# Patient Record
Sex: MALE | Race: OTHER | HISPANIC OR LATINO | URBAN - METROPOLITAN AREA
[De-identification: names, ages, dates, MRNs, and addresses within clinical notes are randomized per-mention and may not be internally consistent; named-entity substitution may affect disease eponyms.]

---

## 2017-03-11 ENCOUNTER — EMERGENCY (EMERGENCY)
Facility: HOSPITAL | Age: 37
LOS: 1 days | Discharge: HOME | End: 2017-03-11
Attending: EMERGENCY MEDICINE | Admitting: EMERGENCY MEDICINE
Payer: SELF-PAY

## 2017-03-11 VITALS
TEMPERATURE: 99 F | RESPIRATION RATE: 18 BRPM | DIASTOLIC BLOOD PRESSURE: 76 MMHG | HEART RATE: 61 BPM | HEIGHT: 68 IN | WEIGHT: 195.99 LBS | OXYGEN SATURATION: 96 % | SYSTOLIC BLOOD PRESSURE: 118 MMHG

## 2017-03-11 PROCEDURE — 99283 EMERGENCY DEPT VISIT LOW MDM: CPT

## 2017-03-11 RX ORDER — MOMETASONE FUROATE 1 MG/G
1 CREAM TOPICAL
Qty: 1 | Refills: 0 | OUTPATIENT
Start: 2017-03-11 | End: 2017-03-18

## 2017-03-11 NOTE — ED STATDOCS - OBJECTIVE STATEMENT
35 y/o M presents to the ED complaining of difficulty breathing, abdominal distension, and depression x 5 days. Pt reports hx of depression but denies taking medications or prior hospital admissions for depression. He denies PO intolerance, nausea, vomiting, and SI/HI. Pt has hx of pancreatitis and TBI (2010) but denies hx of HTN, DM, and HLD. PSHx includes craniotomy and cholecystectomy. No relief with Pepcid. No current medications. Pt denies seeing PCP for this complaint. No further complaints at this time. 37 y/o M presents to the ED complaining of difficulty breathing, abdominal distension, and depression x 5 days. Pt reports hx of depression but denies taking medications or prior hospital admissions for depression. He denies PO intolerance, nausea, vomiting, and SI/HI. Pt has hx of pancreatitis and TBI (2010) but denies hx of HTN, DM, and HLD. PSHx includes craniotomy and cholecystectomy. No relief with Pepcid. No current medications. Pt denies seeing PCP for this complaint. No further complaints at this time. Very difficult to get history from patient as patient talks about wanting the MD to take the wax out of his ear, then says that he wants a medication to keep him from gaing weight.  Girlfriend is with patient and explained that pt was in a coma for 8 months and sustained a severe brain injury

## 2017-03-11 NOTE — ED STATDOCS - ENMT, MLM
Nasal mucosa clear.  Mouth with normal mucosa  Throat has no vesicles, no oropharyngeal exudates and uvula is midline. TMs within normal limit.

## 2017-03-11 NOTE — ED STATDOCS - NS ED MD SCRIBE ATTENDING SCRIBE SECTIONS
PHYSICAL EXAM/PAST MEDICAL/SURGICAL/SOCIAL HISTORY/VITAL SIGNS( Pullset)/REVIEW OF SYSTEMS/DISPOSITION/HIV/HISTORY OF PRESENT ILLNESS/INTAKE ASSESSMENT/SCREENINGS

## 2017-03-11 NOTE — ED STATDOCS - NS ED ROS FT
Pt says that his stomach hurts, he has wax in his ears, he is depressed and was supposed to see the doctor by appointment but had something else to do so he didn't go

## 2017-03-11 NOTE — ED STATDOCS - MEDICAL DECISION MAKING DETAILS
Pt is a TBI pt with multiple complaints. Negative PE except for eczema. Will treat with benadryl and imidazole cream, and have pt f/u with PMD. Pt is a TBI pt with multiple complaints. Negative PE except for eczema. Will treat with benadryl and  mometasone cream, and have pt f/u with PMD.

## 2017-03-26 ENCOUNTER — EMERGENCY (EMERGENCY)
Facility: HOSPITAL | Age: 37
LOS: 1 days | Discharge: HOME | End: 2017-03-26
Attending: EMERGENCY MEDICINE
Payer: SELF-PAY

## 2017-03-26 VITALS
DIASTOLIC BLOOD PRESSURE: 83 MMHG | OXYGEN SATURATION: 98 % | HEIGHT: 69 IN | TEMPERATURE: 98 F | WEIGHT: 199.96 LBS | RESPIRATION RATE: 17 BRPM | HEART RATE: 60 BPM | SYSTOLIC BLOOD PRESSURE: 124 MMHG

## 2017-03-26 DIAGNOSIS — M25.531 PAIN IN RIGHT WRIST: ICD-10-CM

## 2017-03-26 PROCEDURE — 99283 EMERGENCY DEPT VISIT LOW MDM: CPT

## 2017-03-26 PROCEDURE — 73110 X-RAY EXAM OF WRIST: CPT

## 2017-03-26 PROCEDURE — 99284 EMERGENCY DEPT VISIT MOD MDM: CPT | Mod: 25

## 2017-03-26 RX ORDER — INDOMETHACIN 50 MG
50 CAPSULE ORAL ONCE
Qty: 0 | Refills: 0 | Status: COMPLETED | OUTPATIENT
Start: 2017-03-26 | End: 2017-03-26

## 2017-03-26 RX ORDER — OXYCODONE HYDROCHLORIDE 5 MG/1
5 TABLET ORAL ONCE
Qty: 0 | Refills: 0 | Status: DISCONTINUED | OUTPATIENT
Start: 2017-03-26 | End: 2017-03-26

## 2017-03-26 RX ORDER — INDOMETHACIN 50 MG
1 CAPSULE ORAL
Qty: 21 | Refills: 0 | OUTPATIENT
Start: 2017-03-26 | End: 2017-04-02

## 2017-03-26 RX ORDER — OXYCODONE HYDROCHLORIDE 5 MG/1
1 TABLET ORAL
Qty: 8 | Refills: 0 | OUTPATIENT
Start: 2017-03-26 | End: 2017-03-28

## 2017-03-26 RX ORDER — SODIUM CHLORIDE 9 MG/ML
1000 INJECTION INTRAMUSCULAR; INTRAVENOUS; SUBCUTANEOUS ONCE
Qty: 0 | Refills: 0 | Status: DISCONTINUED | OUTPATIENT
Start: 2017-03-26 | End: 2017-03-26

## 2017-03-26 RX ADMIN — Medication 60 MILLIGRAM(S): at 23:59

## 2017-03-26 RX ADMIN — Medication 50 MILLIGRAM(S): at 23:59

## 2017-03-26 RX ADMIN — OXYCODONE HYDROCHLORIDE 5 MILLIGRAM(S): 5 TABLET ORAL at 23:59

## 2017-03-27 PROCEDURE — 73110 X-RAY EXAM OF WRIST: CPT | Mod: 26,RT

## 2017-03-27 NOTE — ED PROVIDER NOTE - OBJECTIVE STATEMENT
This is a 35 y/o male presenting to the ED with right wrist pain that started suddenly this morning. Pt ate shrimp last night. He admits to pain, swelling, heat. He denies falls, injury, fever, chills, loss of rom, loss of sensation, previous hx of gout, sob, cp, hp.

## 2017-03-27 NOTE — ED PROVIDER NOTE - ATTENDING CONTRIBUTION TO CARE
pt with wrist pain.  appears to be gouty arthropathy.  no pain with distal active/passive ROM - doubt septic joint.  d/c with analgesics and pmd follow up

## 2017-03-27 NOTE — ED PROVIDER NOTE - LOCATION
wrist wrist/ttp diffuse right wrist, +swelling ,+heat, no snuff box tenderness, from of right wrist with pain, strength and sensation intact in right wrist, cap refill < 2 secs, radial pulse intact, no skin changes consistent with cellulitis

## 2017-04-16 ENCOUNTER — EMERGENCY (EMERGENCY)
Facility: HOSPITAL | Age: 37
LOS: 1 days | Discharge: HOME | End: 2017-04-16
Attending: EMERGENCY MEDICINE
Payer: SELF-PAY

## 2017-04-16 VITALS
DIASTOLIC BLOOD PRESSURE: 69 MMHG | SYSTOLIC BLOOD PRESSURE: 102 MMHG | HEART RATE: 86 BPM | OXYGEN SATURATION: 97 % | RESPIRATION RATE: 16 BRPM | TEMPERATURE: 99 F

## 2017-04-16 LAB
APPEARANCE UR: CLEAR — SIGNIFICANT CHANGE UP
BACTERIA # UR AUTO: NEGATIVE — SIGNIFICANT CHANGE UP
BILIRUB UR-MCNC: NEGATIVE — SIGNIFICANT CHANGE UP
COLOR SPEC: YELLOW — SIGNIFICANT CHANGE UP
DIFF PNL FLD: NEGATIVE — SIGNIFICANT CHANGE UP
EPI CELLS # UR: NEGATIVE — SIGNIFICANT CHANGE UP
GLUCOSE UR QL: NEGATIVE MG/DL — SIGNIFICANT CHANGE UP
KETONES UR-MCNC: NEGATIVE — SIGNIFICANT CHANGE UP
LEUKOCYTE ESTERASE UR-ACNC: ABNORMAL
NITRITE UR-MCNC: NEGATIVE — SIGNIFICANT CHANGE UP
PH UR: 7 — SIGNIFICANT CHANGE UP (ref 4.8–8)
PROT UR-MCNC: NEGATIVE MG/DL — SIGNIFICANT CHANGE UP
RBC CASTS # UR COMP ASSIST: NEGATIVE /HPF — SIGNIFICANT CHANGE UP (ref 0–4)
SP GR SPEC: 1 — LOW (ref 1.01–1.02)
UROBILINOGEN FLD QL: NEGATIVE MG/DL — SIGNIFICANT CHANGE UP
WBC UR QL: SIGNIFICANT CHANGE UP

## 2017-04-16 PROCEDURE — 99283 EMERGENCY DEPT VISIT LOW MDM: CPT

## 2017-04-16 PROCEDURE — 81001 URINALYSIS AUTO W/SCOPE: CPT

## 2017-04-16 RX ORDER — METHOCARBAMOL 500 MG/1
2 TABLET, FILM COATED ORAL
Qty: 18 | Refills: 0 | OUTPATIENT
Start: 2017-04-16 | End: 2017-04-19

## 2017-04-16 NOTE — ED STATDOCS - MUSCULOSKELETAL, MLM
Slight tenderness in the right para lumbar muscles. Normal LE strength. Ambulating without difficulty. FROM all extremities x4. Normal straight leg raise bilat. Able to bend at waist at 30 degrees with pain.

## 2017-04-16 NOTE — ED STATDOCS - PROGRESS NOTE DETAILS
Pt reports feeling better and states that he will like to go home. Pt reports feeling better and states that he will like to go home. States that he does not want to wait for urine results. D/w Dr. Walden-- pt stable for d/c.

## 2017-04-16 NOTE — ED STATDOCS - NS ED MD SCRIBE ATTENDING SCRIBE SECTIONS
PHYSICAL EXAM/VITAL SIGNS( Pullset)/INTAKE ASSESSMENT/SCREENINGS/HISTORY OF PRESENT ILLNESS/REVIEW OF SYSTEMS/DISPOSITION/HIV/PAST MEDICAL/SURGICAL/SOCIAL HISTORY

## 2017-04-16 NOTE — ED STATDOCS - MEDICAL DECISION MAKING DETAILS
Pt states he was having some discomfort with urination so will do UA. If -, will d/c with Robaxin. Pt has hx of gastritis do not use NSAIDS.

## 2017-04-16 NOTE — ED STATDOCS - OBJECTIVE STATEMENT
35 y/o male with hx of TBI presents to ED c/o right sided low back pain onset yesterday. Pain is exacerbated when he bends down; he reports he may have hurt it while lifting something heavy. Pt has not taken any medication for the symptoms. Denies fever, chills, numbness. No further complaints at this time.

## 2017-04-20 DIAGNOSIS — R56.9 UNSPECIFIED CONVULSIONS: ICD-10-CM

## 2017-04-20 DIAGNOSIS — M54.5 LOW BACK PAIN: ICD-10-CM

## 2017-04-20 DIAGNOSIS — R39.198 OTHER DIFFICULTIES WITH MICTURITION: ICD-10-CM

## 2017-04-20 DIAGNOSIS — Z98.890 OTHER SPECIFIED POSTPROCEDURAL STATES: ICD-10-CM

## 2017-06-04 ENCOUNTER — EMERGENCY (EMERGENCY)
Facility: HOSPITAL | Age: 37
LOS: 1 days | Discharge: HOME | End: 2017-06-04
Attending: EMERGENCY MEDICINE
Payer: SELF-PAY

## 2017-06-04 VITALS
TEMPERATURE: 98 F | RESPIRATION RATE: 16 BRPM | DIASTOLIC BLOOD PRESSURE: 64 MMHG | WEIGHT: 184.97 LBS | HEIGHT: 66 IN | HEART RATE: 54 BPM | SYSTOLIC BLOOD PRESSURE: 104 MMHG | OXYGEN SATURATION: 99 %

## 2017-06-04 LAB
ALBUMIN SERPL ELPH-MCNC: 4.1 G/DL — SIGNIFICANT CHANGE UP (ref 3.3–5.2)
ALP SERPL-CCNC: 41 U/L — SIGNIFICANT CHANGE UP (ref 40–120)
ALT FLD-CCNC: 31 U/L — SIGNIFICANT CHANGE UP
ANION GAP SERPL CALC-SCNC: 15 MMOL/L — SIGNIFICANT CHANGE UP (ref 5–17)
AST SERPL-CCNC: 41 U/L — HIGH
BASOPHILS # BLD AUTO: 0 K/UL — SIGNIFICANT CHANGE UP (ref 0–0.2)
BASOPHILS NFR BLD AUTO: 0.2 % — SIGNIFICANT CHANGE UP (ref 0–2)
BILIRUB SERPL-MCNC: 0.6 MG/DL — SIGNIFICANT CHANGE UP (ref 0.4–2)
BUN SERPL-MCNC: 10 MG/DL — SIGNIFICANT CHANGE UP (ref 8–20)
CALCIUM SERPL-MCNC: 8.2 MG/DL — LOW (ref 8.6–10.2)
CHLORIDE SERPL-SCNC: 106 MMOL/L — SIGNIFICANT CHANGE UP (ref 98–107)
CO2 SERPL-SCNC: 24 MMOL/L — SIGNIFICANT CHANGE UP (ref 22–29)
CREAT SERPL-MCNC: 0.67 MG/DL — SIGNIFICANT CHANGE UP (ref 0.5–1.3)
EOSINOPHIL # BLD AUTO: 0.3 K/UL — SIGNIFICANT CHANGE UP (ref 0–0.5)
EOSINOPHIL NFR BLD AUTO: 4.9 % — SIGNIFICANT CHANGE UP (ref 0–5)
ETHANOL SERPL-MCNC: 131 MG/DL — SIGNIFICANT CHANGE UP
ETHANOL SERPL-MCNC: 179 MG/DL — SIGNIFICANT CHANGE UP
GLUCOSE SERPL-MCNC: 105 MG/DL — SIGNIFICANT CHANGE UP (ref 70–115)
HCT VFR BLD CALC: 40.2 % — LOW (ref 42–52)
HGB BLD-MCNC: 13.3 G/DL — LOW (ref 14–18)
LYMPHOCYTES # BLD AUTO: 1.6 K/UL — SIGNIFICANT CHANGE UP (ref 1–4.8)
LYMPHOCYTES # BLD AUTO: 28.1 % — SIGNIFICANT CHANGE UP (ref 20–55)
MCHC RBC-ENTMCNC: 28.1 PG — SIGNIFICANT CHANGE UP (ref 27–31)
MCHC RBC-ENTMCNC: 33.1 G/DL — SIGNIFICANT CHANGE UP (ref 32–36)
MCV RBC AUTO: 84.8 FL — SIGNIFICANT CHANGE UP (ref 80–94)
MONOCYTES # BLD AUTO: 0.3 K/UL — SIGNIFICANT CHANGE UP (ref 0–0.8)
MONOCYTES NFR BLD AUTO: 5.6 % — SIGNIFICANT CHANGE UP (ref 3–10)
NEUTROPHILS # BLD AUTO: 3.5 K/UL — SIGNIFICANT CHANGE UP (ref 1.8–8)
NEUTROPHILS NFR BLD AUTO: 61 % — SIGNIFICANT CHANGE UP (ref 37–73)
PLATELET # BLD AUTO: 187 K/UL — SIGNIFICANT CHANGE UP (ref 150–400)
POTASSIUM SERPL-MCNC: 3.9 MMOL/L — SIGNIFICANT CHANGE UP (ref 3.5–5.3)
POTASSIUM SERPL-SCNC: 3.9 MMOL/L — SIGNIFICANT CHANGE UP (ref 3.5–5.3)
PROT SERPL-MCNC: 7.4 G/DL — SIGNIFICANT CHANGE UP (ref 6.6–8.7)
RBC # BLD: 4.74 M/UL — SIGNIFICANT CHANGE UP (ref 4.6–6.2)
RBC # FLD: 13 % — SIGNIFICANT CHANGE UP (ref 11–15.6)
SODIUM SERPL-SCNC: 145 MMOL/L — SIGNIFICANT CHANGE UP (ref 135–145)
WBC # BLD: 5.7 K/UL — SIGNIFICANT CHANGE UP (ref 4.8–10.8)
WBC # FLD AUTO: 5.7 K/UL — SIGNIFICANT CHANGE UP (ref 4.8–10.8)

## 2017-06-04 PROCEDURE — 71010: CPT | Mod: 26

## 2017-06-04 PROCEDURE — 99284 EMERGENCY DEPT VISIT MOD MDM: CPT

## 2017-06-04 PROCEDURE — 70450 CT HEAD/BRAIN W/O DYE: CPT | Mod: 26

## 2017-06-04 RX ORDER — SODIUM CHLORIDE 9 MG/ML
1000 INJECTION INTRAMUSCULAR; INTRAVENOUS; SUBCUTANEOUS ONCE
Qty: 0 | Refills: 0 | Status: COMPLETED | OUTPATIENT
Start: 2017-06-04 | End: 2017-06-04

## 2017-06-04 RX ADMIN — SODIUM CHLORIDE 2000 MILLILITER(S): 9 INJECTION INTRAMUSCULAR; INTRAVENOUS; SUBCUTANEOUS at 19:13

## 2017-06-04 NOTE — ED PROVIDER NOTE - PROGRESS NOTE DETAILS
Pt. now more awake and alert. Pt. asking for food and water. Pt. will still go for head CT. Pt. is improving. Case endorsed to Dr. Russell. Follow up BAL.

## 2017-06-04 NOTE — ED PROVIDER NOTE - PHYSICAL EXAMINATION
Pt. is drowsy.   Surgical scar noted to Left parietal region of scalp.  Fungal appearing rash to right lateral chest/abdominal region
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
## 2017-06-04 NOTE — ED ADULT TRIAGE NOTE - CHIEF COMPLAINT QUOTE
BIBA, patient is arouasble to verbal and tactile stimuli, patient admitted to drinking alcohol to ems, unable to provide any hx, Dr Glover at bedside upon arrival

## 2017-06-04 NOTE — ED ADULT NURSE REASSESSMENT NOTE - NS ED NURSE REASSESS COMMENT FT1
Care assumed from off going Rn Jermain at this time, charting as noted. Pt is currently sleeping in stretcher on CM and  in NSR. Pt appears to be in NAD at this time, Will continue to monitor.

## 2017-06-04 NOTE — ED ADULT NURSE NOTE - OBJECTIVE STATEMENT
received in room awake slurred speech [id number], admits to drinking alcohol. denies injury. no belongings at bedside pt wearing hospital gown

## 2017-06-05 VITALS
OXYGEN SATURATION: 98 % | DIASTOLIC BLOOD PRESSURE: 58 MMHG | SYSTOLIC BLOOD PRESSURE: 113 MMHG | TEMPERATURE: 98 F | RESPIRATION RATE: 16 BRPM | HEART RATE: 76 BPM

## 2017-06-21 ENCOUNTER — EMERGENCY (EMERGENCY)
Facility: HOSPITAL | Age: 37
LOS: 1 days | Discharge: HOME | End: 2017-06-21
Attending: EMERGENCY MEDICINE | Admitting: EMERGENCY MEDICINE
Payer: SELF-PAY

## 2017-06-21 VITALS
TEMPERATURE: 98 F | OXYGEN SATURATION: 96 % | DIASTOLIC BLOOD PRESSURE: 64 MMHG | SYSTOLIC BLOOD PRESSURE: 96 MMHG | HEART RATE: 63 BPM | RESPIRATION RATE: 18 BRPM

## 2017-06-21 VITALS — HEIGHT: 69 IN | WEIGHT: 210.1 LBS

## 2017-06-21 LAB
ALBUMIN SERPL ELPH-MCNC: 4.1 G/DL — SIGNIFICANT CHANGE UP (ref 3.3–5.2)
ALP SERPL-CCNC: 44 U/L — SIGNIFICANT CHANGE UP (ref 40–120)
ALT FLD-CCNC: 22 U/L — SIGNIFICANT CHANGE UP
ANION GAP SERPL CALC-SCNC: 15 MMOL/L — SIGNIFICANT CHANGE UP (ref 5–17)
APPEARANCE UR: CLEAR — SIGNIFICANT CHANGE UP
AST SERPL-CCNC: 28 U/L — SIGNIFICANT CHANGE UP
BACTERIA # UR AUTO: NEGATIVE — SIGNIFICANT CHANGE UP
BASOPHILS # BLD AUTO: 0 K/UL — SIGNIFICANT CHANGE UP (ref 0–0.2)
BASOPHILS NFR BLD AUTO: 0.4 % — SIGNIFICANT CHANGE UP (ref 0–2)
BILIRUB SERPL-MCNC: 0.8 MG/DL — SIGNIFICANT CHANGE UP (ref 0.4–2)
BILIRUB UR-MCNC: NEGATIVE — SIGNIFICANT CHANGE UP
BUN SERPL-MCNC: 13 MG/DL — SIGNIFICANT CHANGE UP (ref 8–20)
CALCIUM SERPL-MCNC: 8.6 MG/DL — SIGNIFICANT CHANGE UP (ref 8.6–10.2)
CHLORIDE SERPL-SCNC: 100 MMOL/L — SIGNIFICANT CHANGE UP (ref 98–107)
CO2 SERPL-SCNC: 24 MMOL/L — SIGNIFICANT CHANGE UP (ref 22–29)
COLOR SPEC: YELLOW — SIGNIFICANT CHANGE UP
CREAT SERPL-MCNC: 0.58 MG/DL — SIGNIFICANT CHANGE UP (ref 0.5–1.3)
DIFF PNL FLD: NEGATIVE — SIGNIFICANT CHANGE UP
EOSINOPHIL # BLD AUTO: 0.3 K/UL — SIGNIFICANT CHANGE UP (ref 0–0.5)
EOSINOPHIL NFR BLD AUTO: 6.3 % — HIGH (ref 0–5)
EPI CELLS # UR: NEGATIVE — SIGNIFICANT CHANGE UP
GLUCOSE SERPL-MCNC: 99 MG/DL — SIGNIFICANT CHANGE UP (ref 70–115)
GLUCOSE UR QL: NEGATIVE MG/DL — SIGNIFICANT CHANGE UP
HCT VFR BLD CALC: 39.8 % — LOW (ref 42–52)
HGB BLD-MCNC: 13.5 G/DL — LOW (ref 14–18)
KETONES UR-MCNC: NEGATIVE — SIGNIFICANT CHANGE UP
LEUKOCYTE ESTERASE UR-ACNC: NEGATIVE — SIGNIFICANT CHANGE UP
LIDOCAIN IGE QN: 38 U/L — SIGNIFICANT CHANGE UP (ref 22–51)
LYMPHOCYTES # BLD AUTO: 1.5 K/UL — SIGNIFICANT CHANGE UP (ref 1–4.8)
LYMPHOCYTES # BLD AUTO: 30.2 % — SIGNIFICANT CHANGE UP (ref 20–55)
MCHC RBC-ENTMCNC: 27.8 PG — SIGNIFICANT CHANGE UP (ref 27–31)
MCHC RBC-ENTMCNC: 33.9 G/DL — SIGNIFICANT CHANGE UP (ref 32–36)
MCV RBC AUTO: 81.9 FL — SIGNIFICANT CHANGE UP (ref 80–94)
MONOCYTES # BLD AUTO: 0.4 K/UL — SIGNIFICANT CHANGE UP (ref 0–0.8)
MONOCYTES NFR BLD AUTO: 7.9 % — SIGNIFICANT CHANGE UP (ref 3–10)
NEUTROPHILS # BLD AUTO: 2.8 K/UL — SIGNIFICANT CHANGE UP (ref 1.8–8)
NEUTROPHILS NFR BLD AUTO: 54.8 % — SIGNIFICANT CHANGE UP (ref 37–73)
NITRITE UR-MCNC: NEGATIVE — SIGNIFICANT CHANGE UP
PH UR: 7 — SIGNIFICANT CHANGE UP (ref 5–8)
PLATELET # BLD AUTO: 240 K/UL — SIGNIFICANT CHANGE UP (ref 150–400)
POTASSIUM SERPL-MCNC: 4.1 MMOL/L — SIGNIFICANT CHANGE UP (ref 3.5–5.3)
POTASSIUM SERPL-SCNC: 4.1 MMOL/L — SIGNIFICANT CHANGE UP (ref 3.5–5.3)
PROT SERPL-MCNC: 7.5 G/DL — SIGNIFICANT CHANGE UP (ref 6.6–8.7)
PROT UR-MCNC: NEGATIVE MG/DL — SIGNIFICANT CHANGE UP
RBC # BLD: 4.86 M/UL — SIGNIFICANT CHANGE UP (ref 4.6–6.2)
RBC # FLD: 12.8 % — SIGNIFICANT CHANGE UP (ref 11–15.6)
RBC CASTS # UR COMP ASSIST: NEGATIVE /HPF — SIGNIFICANT CHANGE UP (ref 0–4)
SODIUM SERPL-SCNC: 139 MMOL/L — SIGNIFICANT CHANGE UP (ref 135–145)
SP GR SPEC: 1 — LOW (ref 1.01–1.02)
UROBILINOGEN FLD QL: NEGATIVE MG/DL — SIGNIFICANT CHANGE UP
WBC # BLD: 5 K/UL — SIGNIFICANT CHANGE UP (ref 4.8–10.8)
WBC # FLD AUTO: 5 K/UL — SIGNIFICANT CHANGE UP (ref 4.8–10.8)
WBC UR QL: NEGATIVE — SIGNIFICANT CHANGE UP

## 2017-06-21 PROCEDURE — 83690 ASSAY OF LIPASE: CPT

## 2017-06-21 PROCEDURE — 81001 URINALYSIS AUTO W/SCOPE: CPT

## 2017-06-21 PROCEDURE — 85027 COMPLETE CBC AUTOMATED: CPT

## 2017-06-21 PROCEDURE — 99053 MED SERV 10PM-8AM 24 HR FAC: CPT

## 2017-06-21 PROCEDURE — 99284 EMERGENCY DEPT VISIT MOD MDM: CPT

## 2017-06-21 PROCEDURE — 99284 EMERGENCY DEPT VISIT MOD MDM: CPT | Mod: 25

## 2017-06-21 PROCEDURE — 80053 COMPREHEN METABOLIC PANEL: CPT

## 2017-06-21 PROCEDURE — 36415 COLL VENOUS BLD VENIPUNCTURE: CPT

## 2017-06-21 RX ORDER — SODIUM CHLORIDE 9 MG/ML
3 INJECTION INTRAMUSCULAR; INTRAVENOUS; SUBCUTANEOUS EVERY 8 HOURS
Qty: 0 | Refills: 0 | Status: DISCONTINUED | OUTPATIENT
Start: 2017-06-21 | End: 2017-06-25

## 2017-06-21 RX ORDER — SODIUM CHLORIDE 9 MG/ML
1000 INJECTION INTRAMUSCULAR; INTRAVENOUS; SUBCUTANEOUS ONCE
Qty: 0 | Refills: 0 | Status: COMPLETED | OUTPATIENT
Start: 2017-06-21 | End: 2017-06-21

## 2017-06-21 RX ORDER — FAMOTIDINE 10 MG/ML
1 INJECTION INTRAVENOUS
Qty: 7 | Refills: 0 | OUTPATIENT
Start: 2017-06-21 | End: 2017-06-28

## 2017-06-21 RX ORDER — FAMOTIDINE 10 MG/ML
20 INJECTION INTRAVENOUS ONCE
Qty: 0 | Refills: 0 | Status: DISCONTINUED | OUTPATIENT
Start: 2017-06-21 | End: 2017-06-25

## 2017-06-21 RX ADMIN — SODIUM CHLORIDE 3 MILLILITER(S): 9 INJECTION INTRAMUSCULAR; INTRAVENOUS; SUBCUTANEOUS at 08:34

## 2017-06-21 RX ADMIN — SODIUM CHLORIDE 1000 MILLILITER(S): 9 INJECTION INTRAMUSCULAR; INTRAVENOUS; SUBCUTANEOUS at 08:58

## 2017-06-21 NOTE — ED STATDOCS - PROGRESS NOTE DETAILS
patient re-evaluated with pmhx of TBI, and pancreatitis, pshx subdural evacuation and lap choley, c/o diffuse abdominal pain x 3 weeks associated with diahrea, 6 episodes daily, denies, fevers, vomiting, nausea or chills, PE: abd soft NT, ND, no masses or hernias, +lap choley scars, pending labs and UA, will give IVF and pain meds and have f/u with GI, PO challenge, return to ED with worsening symptoms labs reviewed
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
## 2017-06-21 NOTE — ED ADULT TRIAGE NOTE - CHIEF COMPLAINT QUOTE
pt states he has had diffuse abdominal pain x 3 weeks accompanied by diarrhea. pt denies nausea vomiting

## 2017-06-21 NOTE — ED STATDOCS - ATTENDING CONTRIBUTION TO CARE
I, Kunal Beckman, performed the initial face to face bedside interview with this patient regarding history of present illness, review of symptoms and relevant past medical, social and family history.  I completed an independent physical examination.  I was the initial provider who evaluated this patient. I have signed out the follow up of any pending tests (i.e. labs, radiological studies) to the ACP.  I have communicated the patient’s plan of care and disposition with the ACP.  The history, relevant review of systems, past medical and surgical history, medical decision making, and physical examination was documented by the scribe in my presence and I attest to the accuracy of the documentation.

## 2017-11-05 ENCOUNTER — EMERGENCY (EMERGENCY)
Facility: HOSPITAL | Age: 37
LOS: 1 days | Discharge: HOME | End: 2017-11-05
Attending: EMERGENCY MEDICINE
Payer: COMMERCIAL

## 2017-11-05 VITALS
HEIGHT: 67 IN | OXYGEN SATURATION: 97 % | SYSTOLIC BLOOD PRESSURE: 116 MMHG | RESPIRATION RATE: 16 BRPM | WEIGHT: 205.03 LBS | DIASTOLIC BLOOD PRESSURE: 75 MMHG | TEMPERATURE: 99 F | HEART RATE: 63 BPM

## 2017-11-05 VITALS
DIASTOLIC BLOOD PRESSURE: 82 MMHG | SYSTOLIC BLOOD PRESSURE: 116 MMHG | RESPIRATION RATE: 18 BRPM | TEMPERATURE: 99 F | HEART RATE: 62 BPM | OXYGEN SATURATION: 97 %

## 2017-11-05 LAB
ALBUMIN SERPL ELPH-MCNC: 4.5 G/DL — SIGNIFICANT CHANGE UP (ref 3.3–5.2)
ALP SERPL-CCNC: 45 U/L — SIGNIFICANT CHANGE UP (ref 40–120)
ALT FLD-CCNC: 21 U/L — SIGNIFICANT CHANGE UP
AMPHET UR-MCNC: NEGATIVE — SIGNIFICANT CHANGE UP
ANION GAP SERPL CALC-SCNC: 11 MMOL/L — SIGNIFICANT CHANGE UP (ref 5–17)
APPEARANCE UR: CLEAR — SIGNIFICANT CHANGE UP
APTT BLD: 35.4 SEC — SIGNIFICANT CHANGE UP (ref 27.5–37.4)
AST SERPL-CCNC: 17 U/L — SIGNIFICANT CHANGE UP
BARBITURATES UR SCN-MCNC: NEGATIVE — SIGNIFICANT CHANGE UP
BASOPHILS # BLD AUTO: 0 K/UL — SIGNIFICANT CHANGE UP (ref 0–0.2)
BASOPHILS NFR BLD AUTO: 0.4 % — SIGNIFICANT CHANGE UP (ref 0–2)
BENZODIAZ UR-MCNC: NEGATIVE — SIGNIFICANT CHANGE UP
BILIRUB SERPL-MCNC: 0.8 MG/DL — SIGNIFICANT CHANGE UP (ref 0.4–2)
BILIRUB UR-MCNC: NEGATIVE — SIGNIFICANT CHANGE UP
BUN SERPL-MCNC: 9 MG/DL — SIGNIFICANT CHANGE UP (ref 8–20)
CALCIUM SERPL-MCNC: 8.9 MG/DL — SIGNIFICANT CHANGE UP (ref 8.6–10.2)
CHLORIDE SERPL-SCNC: 102 MMOL/L — SIGNIFICANT CHANGE UP (ref 98–107)
CO2 SERPL-SCNC: 28 MMOL/L — SIGNIFICANT CHANGE UP (ref 22–29)
COCAINE METAB.OTHER UR-MCNC: NEGATIVE — SIGNIFICANT CHANGE UP
COLOR SPEC: YELLOW — SIGNIFICANT CHANGE UP
CREAT SERPL-MCNC: 0.61 MG/DL — SIGNIFICANT CHANGE UP (ref 0.5–1.3)
DIFF PNL FLD: NEGATIVE — SIGNIFICANT CHANGE UP
EOSINOPHIL # BLD AUTO: 0.5 K/UL — SIGNIFICANT CHANGE UP (ref 0–0.5)
EOSINOPHIL NFR BLD AUTO: 9.2 % — HIGH (ref 0–5)
ETHANOL SERPL-MCNC: <10 MG/DL — SIGNIFICANT CHANGE UP
GLUCOSE SERPL-MCNC: 102 MG/DL — SIGNIFICANT CHANGE UP (ref 70–115)
GLUCOSE UR QL: 50 MG/DL
HCT VFR BLD CALC: 40.4 % — LOW (ref 42–52)
HGB BLD-MCNC: 13.8 G/DL — LOW (ref 14–18)
KETONES UR-MCNC: NEGATIVE — SIGNIFICANT CHANGE UP
LEUKOCYTE ESTERASE UR-ACNC: NEGATIVE — SIGNIFICANT CHANGE UP
LYMPHOCYTES # BLD AUTO: 1.8 K/UL — SIGNIFICANT CHANGE UP (ref 1–4.8)
LYMPHOCYTES # BLD AUTO: 34.1 % — SIGNIFICANT CHANGE UP (ref 20–55)
MCHC RBC-ENTMCNC: 28 PG — SIGNIFICANT CHANGE UP (ref 27–31)
MCHC RBC-ENTMCNC: 34.2 G/DL — SIGNIFICANT CHANGE UP (ref 32–36)
MCV RBC AUTO: 82.1 FL — SIGNIFICANT CHANGE UP (ref 80–94)
METHADONE UR-MCNC: NEGATIVE — SIGNIFICANT CHANGE UP
MONOCYTES # BLD AUTO: 0.5 K/UL — SIGNIFICANT CHANGE UP (ref 0–0.8)
MONOCYTES NFR BLD AUTO: 9.6 % — SIGNIFICANT CHANGE UP (ref 3–10)
NEUTROPHILS # BLD AUTO: 2.4 K/UL — SIGNIFICANT CHANGE UP (ref 1.8–8)
NEUTROPHILS NFR BLD AUTO: 46.7 % — SIGNIFICANT CHANGE UP (ref 37–73)
NITRITE UR-MCNC: NEGATIVE — SIGNIFICANT CHANGE UP
OPIATES UR-MCNC: NEGATIVE — SIGNIFICANT CHANGE UP
PCP SPEC-MCNC: SIGNIFICANT CHANGE UP
PCP UR-MCNC: NEGATIVE — SIGNIFICANT CHANGE UP
PH UR: 7 — SIGNIFICANT CHANGE UP (ref 5–8)
PLATELET # BLD AUTO: 227 K/UL — SIGNIFICANT CHANGE UP (ref 150–400)
POTASSIUM SERPL-MCNC: 4.2 MMOL/L — SIGNIFICANT CHANGE UP (ref 3.5–5.3)
POTASSIUM SERPL-SCNC: 4.2 MMOL/L — SIGNIFICANT CHANGE UP (ref 3.5–5.3)
PROT SERPL-MCNC: 7.6 G/DL — SIGNIFICANT CHANGE UP (ref 6.6–8.7)
PROT UR-MCNC: NEGATIVE MG/DL — SIGNIFICANT CHANGE UP
RBC # BLD: 4.92 M/UL — SIGNIFICANT CHANGE UP (ref 4.6–6.2)
RBC # FLD: 12.5 % — SIGNIFICANT CHANGE UP (ref 11–15.6)
SODIUM SERPL-SCNC: 141 MMOL/L — SIGNIFICANT CHANGE UP (ref 135–145)
SP GR SPEC: 1 — LOW (ref 1.01–1.02)
THC UR QL: NEGATIVE — SIGNIFICANT CHANGE UP
TROPONIN T SERPL-MCNC: <0.01 NG/ML — SIGNIFICANT CHANGE UP (ref 0–0.06)
UROBILINOGEN FLD QL: NEGATIVE MG/DL — SIGNIFICANT CHANGE UP
WBC # BLD: 5.2 K/UL — SIGNIFICANT CHANGE UP (ref 4.8–10.8)
WBC # FLD AUTO: 5.2 K/UL — SIGNIFICANT CHANGE UP (ref 4.8–10.8)

## 2017-11-05 PROCEDURE — 99053 MED SERV 10PM-8AM 24 HR FAC: CPT

## 2017-11-05 PROCEDURE — 36415 COLL VENOUS BLD VENIPUNCTURE: CPT

## 2017-11-05 PROCEDURE — 85027 COMPLETE CBC AUTOMATED: CPT

## 2017-11-05 PROCEDURE — 84484 ASSAY OF TROPONIN QUANT: CPT

## 2017-11-05 PROCEDURE — 81003 URINALYSIS AUTO W/O SCOPE: CPT

## 2017-11-05 PROCEDURE — 71045 X-RAY EXAM CHEST 1 VIEW: CPT

## 2017-11-05 PROCEDURE — 80307 DRUG TEST PRSMV CHEM ANLYZR: CPT

## 2017-11-05 PROCEDURE — 71010: CPT | Mod: 26

## 2017-11-05 PROCEDURE — 70450 CT HEAD/BRAIN W/O DYE: CPT | Mod: 26

## 2017-11-05 PROCEDURE — 99285 EMERGENCY DEPT VISIT HI MDM: CPT | Mod: 25

## 2017-11-05 PROCEDURE — 85730 THROMBOPLASTIN TIME PARTIAL: CPT

## 2017-11-05 PROCEDURE — T1013: CPT

## 2017-11-05 PROCEDURE — 80053 COMPREHEN METABOLIC PANEL: CPT

## 2017-11-05 PROCEDURE — 70450 CT HEAD/BRAIN W/O DYE: CPT

## 2017-11-05 PROCEDURE — 99284 EMERGENCY DEPT VISIT MOD MDM: CPT | Mod: 25

## 2017-11-05 RX ORDER — SODIUM CHLORIDE 9 MG/ML
1000 INJECTION, SOLUTION INTRAVENOUS
Qty: 0 | Refills: 0 | Status: DISCONTINUED | OUTPATIENT
Start: 2017-11-05 | End: 2017-11-11

## 2017-11-05 NOTE — ED PROVIDER NOTE - CRANIAL NERVE AND PUPILLARY EXAM
corneal reflex intact/central vision intact/extra-ocular movements intact/gag reflex intact/slight flattening of right nasolabial fold with sparring of forehead/cough reflex intact

## 2017-11-05 NOTE — ED PROVIDER NOTE - MEDICAL DECISION MAKING DETAILS
d/c home non specfic symptoms for over one month non focal here from baseline outpt neuro f/u stress with caregive  at bedside
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
## 2017-11-05 NOTE — ED PROVIDER NOTE - OBJECTIVE STATEMENT
38 y/o M hx of prior TBI reports in 2007 prior craniectomy, not taking any meds presents with recurrent HAs for several days, nausea/abd cramping and fatigue does admit to ETOH use, says was drinking more than he wanted to but stopped 4 days ago denies drug use denies seizures, pt is not best historian does appears to have some remote and recent memory issues at baseline denies new trauma no visual changes no loss of strength or changes in speech, pt does have right facial droop - he says he thinks this is baseline
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
## 2017-11-05 NOTE — ED PROVIDER NOTE - CHPI ED SYMPTOMS NEG
no change in level of consciousness/no loss of consciousness/no confusion/no fever/no blurred vision
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
## 2018-01-26 ENCOUNTER — EMERGENCY (EMERGENCY)
Facility: HOSPITAL | Age: 38
LOS: 1 days | Discharge: HOME | End: 2018-01-26
Attending: EMERGENCY MEDICINE | Admitting: EMERGENCY MEDICINE
Payer: SELF-PAY

## 2018-01-26 VITALS
OXYGEN SATURATION: 96 % | HEART RATE: 68 BPM | RESPIRATION RATE: 18 BRPM | DIASTOLIC BLOOD PRESSURE: 63 MMHG | SYSTOLIC BLOOD PRESSURE: 116 MMHG | WEIGHT: 240.08 LBS | TEMPERATURE: 98 F | HEIGHT: 66 IN

## 2018-01-26 LAB
ANION GAP SERPL CALC-SCNC: 11 MMOL/L — SIGNIFICANT CHANGE UP (ref 5–17)
BUN SERPL-MCNC: 16 MG/DL — SIGNIFICANT CHANGE UP (ref 8–20)
CALCIUM SERPL-MCNC: 9.3 MG/DL — SIGNIFICANT CHANGE UP (ref 8.6–10.2)
CHLORIDE SERPL-SCNC: 103 MMOL/L — SIGNIFICANT CHANGE UP (ref 98–107)
CO2 SERPL-SCNC: 26 MMOL/L — SIGNIFICANT CHANGE UP (ref 22–29)
CREAT SERPL-MCNC: 0.66 MG/DL — SIGNIFICANT CHANGE UP (ref 0.5–1.3)
GLUCOSE SERPL-MCNC: 110 MG/DL — SIGNIFICANT CHANGE UP (ref 70–115)
HCT VFR BLD CALC: 40.6 % — LOW (ref 42–52)
HGB BLD-MCNC: 13.6 G/DL — LOW (ref 14–18)
MCHC RBC-ENTMCNC: 27.9 PG — SIGNIFICANT CHANGE UP (ref 27–31)
MCHC RBC-ENTMCNC: 33.5 G/DL — SIGNIFICANT CHANGE UP (ref 32–36)
MCV RBC AUTO: 83.2 FL — SIGNIFICANT CHANGE UP (ref 80–94)
PLATELET # BLD AUTO: 217 K/UL — SIGNIFICANT CHANGE UP (ref 150–400)
POTASSIUM SERPL-MCNC: 4 MMOL/L — SIGNIFICANT CHANGE UP (ref 3.5–5.3)
POTASSIUM SERPL-SCNC: 4 MMOL/L — SIGNIFICANT CHANGE UP (ref 3.5–5.3)
RBC # BLD: 4.88 M/UL — SIGNIFICANT CHANGE UP (ref 4.6–6.2)
RBC # FLD: 12.6 % — SIGNIFICANT CHANGE UP (ref 11–15.6)
SODIUM SERPL-SCNC: 140 MMOL/L — SIGNIFICANT CHANGE UP (ref 135–145)
WBC # BLD: 7 K/UL — SIGNIFICANT CHANGE UP (ref 4.8–10.8)
WBC # FLD AUTO: 7 K/UL — SIGNIFICANT CHANGE UP (ref 4.8–10.8)

## 2018-01-26 PROCEDURE — 99284 EMERGENCY DEPT VISIT MOD MDM: CPT

## 2018-01-26 PROCEDURE — 96374 THER/PROPH/DIAG INJ IV PUSH: CPT

## 2018-01-26 PROCEDURE — 99284 EMERGENCY DEPT VISIT MOD MDM: CPT | Mod: 25

## 2018-01-26 PROCEDURE — 36415 COLL VENOUS BLD VENIPUNCTURE: CPT

## 2018-01-26 PROCEDURE — 82553 CREATINE MB FRACTION: CPT

## 2018-01-26 PROCEDURE — 70450 CT HEAD/BRAIN W/O DYE: CPT | Mod: 26

## 2018-01-26 PROCEDURE — 80048 BASIC METABOLIC PNL TOTAL CA: CPT

## 2018-01-26 PROCEDURE — 85027 COMPLETE CBC AUTOMATED: CPT

## 2018-01-26 PROCEDURE — 70450 CT HEAD/BRAIN W/O DYE: CPT

## 2018-01-26 PROCEDURE — 82550 ASSAY OF CK (CPK): CPT

## 2018-01-26 RX ORDER — LEVETIRACETAM 250 MG/1
1000 TABLET, FILM COATED ORAL ONCE
Qty: 0 | Refills: 0 | Status: COMPLETED | OUTPATIENT
Start: 2018-01-26 | End: 2018-01-26

## 2018-01-26 RX ADMIN — LEVETIRACETAM 400 MILLIGRAM(S): 250 TABLET, FILM COATED ORAL at 22:37

## 2018-01-26 NOTE — ED PROVIDER NOTE - MEDICAL DECISION MAKING DETAILS
ambulating in nad seizure likely from medication non compliance stable now keppra refilled. return to ed for intractable HA, persistent vomiting, or new onset motor/sensory deficits

## 2018-01-26 NOTE — ED ADULT TRIAGE NOTE - CHIEF COMPLAINT QUOTE
Pt with unwitnessed seizure at home today, hx of seizure, non compliant with keppra, c/o headache and aura, no obvious trauma noted

## 2018-01-26 NOTE — ED PROVIDER NOTE - OBJECTIVE STATEMENT
38 y/o M pt with hx of seizures presents to ED s/p seizures at home. Pt is noncompliant with Keppra; because he states he felt better. Currently pt has a headache.  denies fever. denies neck pain. no chest pain or sob. no abd pain. no n/v/d. no urinary f/u/d. no back pain. no motor or sensory deficits. denies drug use. no recent travel. no rash. no other acute issues symptoms or concerns

## 2018-01-27 RX ORDER — LEVETIRACETAM 250 MG/1
1 TABLET, FILM COATED ORAL
Qty: 28 | Refills: 0 | OUTPATIENT
Start: 2018-01-27 | End: 2018-02-09

## 2018-03-23 ENCOUNTER — EMERGENCY (EMERGENCY)
Facility: HOSPITAL | Age: 38
LOS: 1 days | Discharge: HOME | End: 2018-03-23
Attending: EMERGENCY MEDICINE
Payer: SELF-PAY

## 2018-03-23 VITALS
TEMPERATURE: 98 F | DIASTOLIC BLOOD PRESSURE: 86 MMHG | OXYGEN SATURATION: 95 % | SYSTOLIC BLOOD PRESSURE: 129 MMHG | HEART RATE: 78 BPM | HEIGHT: 70 IN | RESPIRATION RATE: 20 BRPM | WEIGHT: 210.1 LBS

## 2018-03-23 PROCEDURE — 99283 EMERGENCY DEPT VISIT LOW MDM: CPT

## 2018-03-23 RX ADMIN — Medication 1 TABLET(S): at 22:40

## 2018-03-23 NOTE — ED ADULT NURSE NOTE - NEURO WDL
Alert and oriented to person, place and time, memory intact, behavior appropriate to situation, PERRL. c/o headache

## 2018-03-23 NOTE — ED ADULT NURSE NOTE - CAS ELECT INFOMATION PROVIDED
pt d/c in stable condition, no apparent distress noted at this time. pt A&Ox3. pt able to ambulate with steady gait./DC instructions

## 2018-03-23 NOTE — ED STATDOCS - OBJECTIVE STATEMENT
36 y/o M pt with hx of TBI presents to ED c/o HA and left ear pain which started at 17:00 earlier today. He has not taken any medications for treatment. Denies tooth pain at this time. No further complaints at this time. This patient is a 38 y/o man with hx of TBI who presents to ED c/o HA and left ear pain which started at 17:00 earlier today. He has not taken any medications for treatment. Denies tooth pain at this time.  No falls, injury, or trauma preceding the event. No further complaints at this time.

## 2018-03-23 NOTE — ED ADULT NURSE NOTE - CHPI ED SYMPTOMS NEG
no blurred vision/no confusion/no weakness/no loss of consciousness/no fever/no vomiting/no numbness/no dizziness/no nausea/no change in level of consciousness
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
## 2018-03-23 NOTE — ED STATDOCS - NEUROLOGICAL, MLM
GCS 15. Cranial nerves 2-12 intact. GCS 15. Cranial nerves 2-12 intact.; no motor or sensory deficits

## 2018-07-16 NOTE — ED PROVIDER NOTE - CONTEXT
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  Its brochure given along with its website.   He watched the educational video.  He understands that his urinary symptoms may not be better for the first month, and even  up to 3 months when the denaturing and absorption of the prostate tissues will be completed.  Expect to keep an indwelling catheter up to 1 wk.  He needs to stop blood thinning medications 1 wk before the procedure.  Needs to have someone to drive in and out for your procedure.  Nothing by mouth for anesthesia for 8 hours before the procedure.        
unknown

## 2018-07-29 ENCOUNTER — EMERGENCY (EMERGENCY)
Facility: HOSPITAL | Age: 38
LOS: 1 days | Discharge: HOME | End: 2018-07-29
Attending: EMERGENCY MEDICINE
Payer: SELF-PAY

## 2018-07-29 VITALS
HEART RATE: 62 BPM | TEMPERATURE: 98 F | RESPIRATION RATE: 18 BRPM | OXYGEN SATURATION: 96 % | SYSTOLIC BLOOD PRESSURE: 99 MMHG | DIASTOLIC BLOOD PRESSURE: 65 MMHG

## 2018-07-29 VITALS — HEIGHT: 72 IN | WEIGHT: 210.1 LBS

## 2018-07-29 PROBLEM — S06.9X9A UNSPECIFIED INTRACRANIAL INJURY WITH LOSS OF CONSCIOUSNESS OF UNSPECIFIED DURATION, INITIAL ENCOUNTER: Chronic | Status: ACTIVE | Noted: 2017-03-11

## 2018-07-29 PROBLEM — F10.10 ALCOHOL ABUSE, UNCOMPLICATED: Chronic | Status: ACTIVE | Noted: 2017-06-04

## 2018-07-29 LAB
APPEARANCE UR: CLEAR — SIGNIFICANT CHANGE UP
BACTERIA # UR AUTO: ABNORMAL
BILIRUB UR-MCNC: NEGATIVE — SIGNIFICANT CHANGE UP
COLOR SPEC: YELLOW — SIGNIFICANT CHANGE UP
DIFF PNL FLD: NEGATIVE — SIGNIFICANT CHANGE UP
EPI CELLS # UR: SIGNIFICANT CHANGE UP
GLUCOSE UR QL: NEGATIVE MG/DL — SIGNIFICANT CHANGE UP
KETONES UR-MCNC: NEGATIVE — SIGNIFICANT CHANGE UP
LEUKOCYTE ESTERASE UR-ACNC: NEGATIVE — SIGNIFICANT CHANGE UP
NITRITE UR-MCNC: NEGATIVE — SIGNIFICANT CHANGE UP
PH UR: 6.5 — SIGNIFICANT CHANGE UP (ref 5–8)
PROT UR-MCNC: NEGATIVE MG/DL — SIGNIFICANT CHANGE UP
RBC CASTS # UR COMP ASSIST: SIGNIFICANT CHANGE UP /HPF (ref 0–4)
SP GR SPEC: 1.01 — SIGNIFICANT CHANGE UP (ref 1.01–1.02)
UROBILINOGEN FLD QL: NEGATIVE MG/DL — SIGNIFICANT CHANGE UP
WBC UR QL: SIGNIFICANT CHANGE UP

## 2018-07-29 PROCEDURE — 99283 EMERGENCY DEPT VISIT LOW MDM: CPT | Mod: 25

## 2018-07-29 PROCEDURE — T1013: CPT

## 2018-07-29 PROCEDURE — 87086 URINE CULTURE/COLONY COUNT: CPT

## 2018-07-29 PROCEDURE — 72100 X-RAY EXAM L-S SPINE 2/3 VWS: CPT

## 2018-07-29 PROCEDURE — 81001 URINALYSIS AUTO W/SCOPE: CPT

## 2018-07-29 PROCEDURE — 96372 THER/PROPH/DIAG INJ SC/IM: CPT

## 2018-07-29 PROCEDURE — 72100 X-RAY EXAM L-S SPINE 2/3 VWS: CPT | Mod: 26

## 2018-07-29 PROCEDURE — 99283 EMERGENCY DEPT VISIT LOW MDM: CPT

## 2018-07-29 RX ORDER — METHOCARBAMOL 500 MG/1
2 TABLET, FILM COATED ORAL
Qty: 30 | Refills: 0 | OUTPATIENT
Start: 2018-07-29 | End: 2018-08-02

## 2018-07-29 RX ORDER — LIDOCAINE 4 G/100G
1 CREAM TOPICAL ONCE
Qty: 0 | Refills: 0 | Status: COMPLETED | OUTPATIENT
Start: 2018-07-29 | End: 2018-07-29

## 2018-07-29 RX ORDER — IBUPROFEN 200 MG
1 TABLET ORAL
Qty: 20 | Refills: 0 | OUTPATIENT
Start: 2018-07-29 | End: 2018-08-02

## 2018-07-29 RX ORDER — METHOCARBAMOL 500 MG/1
1500 TABLET, FILM COATED ORAL ONCE
Qty: 0 | Refills: 0 | Status: COMPLETED | OUTPATIENT
Start: 2018-07-29 | End: 2018-07-29

## 2018-07-29 RX ORDER — KETOROLAC TROMETHAMINE 30 MG/ML
60 SYRINGE (ML) INJECTION ONCE
Qty: 0 | Refills: 0 | Status: DISCONTINUED | OUTPATIENT
Start: 2018-07-29 | End: 2018-07-29

## 2018-07-29 RX ADMIN — Medication 60 MILLIGRAM(S): at 13:08

## 2018-07-29 RX ADMIN — METHOCARBAMOL 1500 MILLIGRAM(S): 500 TABLET, FILM COATED ORAL at 13:08

## 2018-07-29 RX ADMIN — LIDOCAINE 1 PATCH: 4 CREAM TOPICAL at 13:08

## 2018-07-29 NOTE — ED PROVIDER NOTE - ATTENDING CONTRIBUTION TO CARE
Pt. with tenderness to Right lumbar region. +Paraspinal tenderness. I have discussed the plan with the ACP.

## 2018-07-29 NOTE — ED STATDOCS - OBJECTIVE STATEMENT
This is a 37 year old male with c/o back pain x 1 week.  He reports pain is worse with movement.  He denies any LE weakness or issues with bowel habits.  Patient reports not taking anything for pain.  He denies any urinary symptoms, fevers, chills, n/v/d or any sick contacts, recent travel or rashes.

## 2018-07-30 LAB
CULTURE RESULTS: NO GROWTH — SIGNIFICANT CHANGE UP
SPECIMEN SOURCE: SIGNIFICANT CHANGE UP

## 2018-08-18 NOTE — ED ADULT NURSE NOTE - PYSCHOSOCIAL ASSESSMENT
WDL

## 2018-10-05 ENCOUNTER — EMERGENCY (EMERGENCY)
Facility: HOSPITAL | Age: 38
LOS: 1 days | Discharge: HOME | End: 2018-10-05
Attending: EMERGENCY MEDICINE
Payer: SELF-PAY

## 2018-10-05 VITALS
DIASTOLIC BLOOD PRESSURE: 71 MMHG | OXYGEN SATURATION: 95 % | RESPIRATION RATE: 20 BRPM | TEMPERATURE: 98 F | HEART RATE: 66 BPM | SYSTOLIC BLOOD PRESSURE: 111 MMHG

## 2018-10-05 PROCEDURE — 73610 X-RAY EXAM OF ANKLE: CPT | Mod: 26,RT

## 2018-10-05 PROCEDURE — 73630 X-RAY EXAM OF FOOT: CPT | Mod: 26,RT

## 2018-10-05 PROCEDURE — 73630 X-RAY EXAM OF FOOT: CPT

## 2018-10-05 PROCEDURE — 99283 EMERGENCY DEPT VISIT LOW MDM: CPT

## 2018-10-05 PROCEDURE — 73610 X-RAY EXAM OF ANKLE: CPT

## 2018-10-05 PROCEDURE — 99284 EMERGENCY DEPT VISIT MOD MDM: CPT

## 2018-10-05 RX ORDER — IBUPROFEN 200 MG
800 TABLET ORAL ONCE
Qty: 0 | Refills: 0 | Status: COMPLETED | OUTPATIENT
Start: 2018-10-05 | End: 2018-10-05

## 2018-10-05 RX ORDER — IBUPROFEN 200 MG
1 TABLET ORAL
Qty: 15 | Refills: 0 | OUTPATIENT
Start: 2018-10-05 | End: 2018-10-09

## 2018-10-05 RX ADMIN — Medication 800 MILLIGRAM(S): at 18:16

## 2018-10-05 NOTE — ED STATDOCS - ATTENDING CONTRIBUTION TO CARE
I, Ken Donovan, performed the initial face to face bedside interview with this patient regarding history of present illness, review of symptoms and relevant past medical, social and family history.  I completed an independent physical examination.  I was the initial provider who evaluated this patient. I have signed out the follow up of any pending tests (i.e. labs, radiological studies) to the ACP.  I have communicated the patient’s plan of care and disposition with the ACP.

## 2018-10-05 NOTE — ED STATDOCS - PROGRESS NOTE DETAILS
NP NOTE:  HPI, ROS, PE of intake doctor reviewed.  x-rays without acute fx.  Aircast applied, cane provided.  RICE ibuprofen, refer to ortho.

## 2018-10-05 NOTE — ED STATDOCS - MEDICAL DECISION MAKING DETAILS
R ankle pain/swelling s/p inversion injury, no associated knee pain, x-ray for bony injury, pain meds, check and reassess, follow up.
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
## 2018-12-31 ENCOUNTER — EMERGENCY (EMERGENCY)
Facility: HOSPITAL | Age: 38
LOS: 1 days | Discharge: HOME | End: 2018-12-31
Attending: EMERGENCY MEDICINE
Payer: SELF-PAY

## 2018-12-31 VITALS
SYSTOLIC BLOOD PRESSURE: 112 MMHG | DIASTOLIC BLOOD PRESSURE: 69 MMHG | HEART RATE: 63 BPM | OXYGEN SATURATION: 97 % | TEMPERATURE: 98 F | RESPIRATION RATE: 16 BRPM | HEIGHT: 68 IN | WEIGHT: 207.01 LBS

## 2018-12-31 LAB
ALBUMIN SERPL ELPH-MCNC: 4.5 G/DL — SIGNIFICANT CHANGE UP (ref 3.3–5.2)
ALP SERPL-CCNC: 42 U/L — SIGNIFICANT CHANGE UP (ref 40–120)
ALT FLD-CCNC: 44 U/L — HIGH
ANION GAP SERPL CALC-SCNC: 9 MMOL/L — SIGNIFICANT CHANGE UP (ref 5–17)
AST SERPL-CCNC: 24 U/L — SIGNIFICANT CHANGE UP
BASOPHILS # BLD AUTO: 0 K/UL — SIGNIFICANT CHANGE UP (ref 0–0.2)
BASOPHILS NFR BLD AUTO: 0.2 % — SIGNIFICANT CHANGE UP (ref 0–2)
BILIRUB SERPL-MCNC: 0.9 MG/DL — SIGNIFICANT CHANGE UP (ref 0.4–2)
BUN SERPL-MCNC: 9 MG/DL — SIGNIFICANT CHANGE UP (ref 8–20)
CALCIUM SERPL-MCNC: 9.2 MG/DL — SIGNIFICANT CHANGE UP (ref 8.6–10.2)
CHLORIDE SERPL-SCNC: 100 MMOL/L — SIGNIFICANT CHANGE UP (ref 98–107)
CO2 SERPL-SCNC: 30 MMOL/L — HIGH (ref 22–29)
CREAT SERPL-MCNC: 0.64 MG/DL — SIGNIFICANT CHANGE UP (ref 0.5–1.3)
EOSINOPHIL # BLD AUTO: 0.3 K/UL — SIGNIFICANT CHANGE UP (ref 0–0.5)
EOSINOPHIL NFR BLD AUTO: 5.7 % — HIGH (ref 0–5)
GLUCOSE SERPL-MCNC: 91 MG/DL — SIGNIFICANT CHANGE UP (ref 70–115)
HCT VFR BLD CALC: 42.6 % — SIGNIFICANT CHANGE UP (ref 42–52)
HGB BLD-MCNC: 14.5 G/DL — SIGNIFICANT CHANGE UP (ref 14–18)
LIDOCAIN IGE QN: 34 U/L — SIGNIFICANT CHANGE UP (ref 22–51)
LYMPHOCYTES # BLD AUTO: 1.4 K/UL — SIGNIFICANT CHANGE UP (ref 1–4.8)
LYMPHOCYTES # BLD AUTO: 29.3 % — SIGNIFICANT CHANGE UP (ref 20–55)
MCHC RBC-ENTMCNC: 28.3 PG — SIGNIFICANT CHANGE UP (ref 27–31)
MCHC RBC-ENTMCNC: 34 G/DL — SIGNIFICANT CHANGE UP (ref 32–36)
MCV RBC AUTO: 83 FL — SIGNIFICANT CHANGE UP (ref 80–94)
MONOCYTES # BLD AUTO: 0.4 K/UL — SIGNIFICANT CHANGE UP (ref 0–0.8)
MONOCYTES NFR BLD AUTO: 8.5 % — SIGNIFICANT CHANGE UP (ref 3–10)
NEUTROPHILS # BLD AUTO: 2.6 K/UL — SIGNIFICANT CHANGE UP (ref 1.8–8)
NEUTROPHILS NFR BLD AUTO: 56.1 % — SIGNIFICANT CHANGE UP (ref 37–73)
PLATELET # BLD AUTO: 204 K/UL — SIGNIFICANT CHANGE UP (ref 150–400)
POTASSIUM SERPL-MCNC: 3.6 MMOL/L — SIGNIFICANT CHANGE UP (ref 3.5–5.3)
POTASSIUM SERPL-SCNC: 3.6 MMOL/L — SIGNIFICANT CHANGE UP (ref 3.5–5.3)
PROT SERPL-MCNC: 7.7 G/DL — SIGNIFICANT CHANGE UP (ref 6.6–8.7)
RBC # BLD: 5.13 M/UL — SIGNIFICANT CHANGE UP (ref 4.6–6.2)
RBC # FLD: 12.6 % — SIGNIFICANT CHANGE UP (ref 11–15.6)
SODIUM SERPL-SCNC: 139 MMOL/L — SIGNIFICANT CHANGE UP (ref 135–145)
WBC # BLD: 4.6 K/UL — LOW (ref 4.8–10.8)
WBC # FLD AUTO: 4.6 K/UL — LOW (ref 4.8–10.8)

## 2018-12-31 PROCEDURE — 80053 COMPREHEN METABOLIC PANEL: CPT

## 2018-12-31 PROCEDURE — 83690 ASSAY OF LIPASE: CPT

## 2018-12-31 PROCEDURE — 36415 COLL VENOUS BLD VENIPUNCTURE: CPT

## 2018-12-31 PROCEDURE — 85027 COMPLETE CBC AUTOMATED: CPT

## 2018-12-31 PROCEDURE — 99283 EMERGENCY DEPT VISIT LOW MDM: CPT

## 2018-12-31 PROCEDURE — T1013: CPT

## 2018-12-31 PROCEDURE — 99284 EMERGENCY DEPT VISIT MOD MDM: CPT

## 2018-12-31 NOTE — ED ADULT NURSE NOTE - NSIMPLEMENTINTERV_GEN_ALL_ED
Implemented All Universal Safety Interventions:  [place] to call system. Call bell, personal items and telephone within reach. Instruct patient to call for assistance. Room bathroom lighting operational. Non-slip footwear when patient is off stretcher. Physically safe environment: no spills, clutter or unnecessary equipment. Stretcher in lowest position, wheels locked, appropriate side rails in place.

## 2018-12-31 NOTE — ED STATDOCS - OBJECTIVE STATEMENT
39 y/o M pt with hx of seizures (On Keppra) presents to ED c/o worsening abdominal pain associated with diarrhea for the past  3 months. No follow up with gastroenterologists. Denies fevers, nausea, vomiting, urinary symptoms. No further complaints at this time.

## 2018-12-31 NOTE — ED STATDOCS - ATTENDING CONTRIBUTION TO CARE
I, Paty Daugherty, performed the initial face to face bedside interview with this patient regarding history of present illness, review of symptoms and relevant past medical, social and family history.  I completed an independent physical examination.  I was the initial provider who evaluated this patient. I have signed out the follow up of any pending tests (i.e. labs, radiological studies) to the ACP.  I have communicated the patient’s plan of care and disposition with the ACP.  The history, relevant review of systems, past medical and surgical history, medical decision making, and physical examination was documented by the scribe in my presence and I attest to the accuracy of the documentation.

## 2018-12-31 NOTE — ED STATDOCS - PROGRESS NOTE DETAILS
PA Note: PT evaluated by intake physician. HPI/PE/ROS as noted above. Will follow up plan per intake physician  : Kasia Lipase and labs wnl. Will instruct pt to go on lactose free diet and GI follow up. Lipase and labs wnl. Pt instructed to go on lactose free diet and given gastroenterology follow up.  Interpretor: Rand

## 2019-01-23 ENCOUNTER — MED ADMIN CHARGE (OUTPATIENT)
Age: 39
End: 2019-01-23

## 2019-01-23 ENCOUNTER — OUTPATIENT (OUTPATIENT)
Dept: OUTPATIENT SERVICES | Facility: HOSPITAL | Age: 39
LOS: 1 days | End: 2019-01-23
Payer: SELF-PAY

## 2019-01-23 ENCOUNTER — APPOINTMENT (OUTPATIENT)
Dept: INTERNAL MEDICINE | Facility: CLINIC | Age: 39
End: 2019-01-23

## 2019-01-23 ENCOUNTER — LABORATORY RESULT (OUTPATIENT)
Age: 39
End: 2019-01-23

## 2019-01-23 VITALS
SYSTOLIC BLOOD PRESSURE: 120 MMHG | WEIGHT: 240 LBS | BODY MASS INDEX: 38.57 KG/M2 | DIASTOLIC BLOOD PRESSURE: 70 MMHG | HEIGHT: 66 IN

## 2019-01-23 DIAGNOSIS — Z00.00 ENCOUNTER FOR GENERAL ADULT MEDICAL EXAMINATION W/OUT ABNORMAL FINDINGS: ICD-10-CM

## 2019-01-23 DIAGNOSIS — K52.9 NONINFECTIVE GASTROENTERITIS AND COLITIS, UNSPECIFIED: ICD-10-CM

## 2019-01-23 DIAGNOSIS — Z23 ENCOUNTER FOR IMMUNIZATION: ICD-10-CM

## 2019-01-23 DIAGNOSIS — S06.9X9D UNSPECIFIED INTRACRANIAL INJURY WITH LOSS OF CONSCIOUSNESS OF UNSPECIFIED DURATION, SUBSEQUENT ENCOUNTER: ICD-10-CM

## 2019-01-23 DIAGNOSIS — Z87.19 PERSONAL HISTORY OF OTHER DISEASES OF THE DIGESTIVE SYSTEM: ICD-10-CM

## 2019-01-23 DIAGNOSIS — I10 ESSENTIAL (PRIMARY) HYPERTENSION: ICD-10-CM

## 2019-01-23 NOTE — PHYSICAL EXAM

## 2019-01-24 LAB
MEV IGG SER-ACNC: 108 AU/ML — SIGNIFICANT CHANGE UP
MEV IGG+IGM SER-IMP: POSITIVE — SIGNIFICANT CHANGE UP
MUV AB SER-ACNC: POSITIVE — SIGNIFICANT CHANGE UP
MUV IGG FLD-ACNC: 12.4 AU/ML — SIGNIFICANT CHANGE UP
RUBV IGG SER-ACNC: 16 INDEX — SIGNIFICANT CHANGE UP
RUBV IGG SER-IMP: POSITIVE — SIGNIFICANT CHANGE UP

## 2019-01-25 ENCOUNTER — LABORATORY RESULT (OUTPATIENT)
Age: 39
End: 2019-01-25

## 2019-01-25 ENCOUNTER — APPOINTMENT (OUTPATIENT)
Age: 39
End: 2019-01-25

## 2019-01-25 PROCEDURE — 87046 STOOL CULTR AEROBIC BACT EA: CPT

## 2019-01-25 PROCEDURE — 90688 IIV4 VACCINE SPLT 0.5 ML IM: CPT

## 2019-01-25 PROCEDURE — 86765 RUBEOLA ANTIBODY: CPT

## 2019-01-25 PROCEDURE — 87177 OVA AND PARASITES SMEARS: CPT

## 2019-01-25 PROCEDURE — 86735 MUMPS ANTIBODY: CPT

## 2019-01-25 PROCEDURE — G0008: CPT

## 2019-01-25 PROCEDURE — 86364 TISS TRNSGLTMNASE EA IG CLAS: CPT

## 2019-01-25 PROCEDURE — G0463: CPT

## 2019-01-25 PROCEDURE — 87045 FECES CULTURE AEROBIC BACT: CPT

## 2019-01-25 PROCEDURE — 86762 RUBELLA ANTIBODY: CPT

## 2019-01-25 PROCEDURE — 83520 IMMUNOASSAY QUANT NOS NONAB: CPT

## 2019-01-30 NOTE — HISTORY OF PRESENT ILLNESS
[FreeTextEntry1] : Here for CPE, also with diarrhea  [de-identified] : 39 yo M PMHx of TBI, cholecystectomy, presenting for CPE, also endorsing chronic diarrhea. Patient has a permanent disability s/p TBI, accompanied by a female friend whom he lives with. Reports that his diarrhea is chronic >2 years, profuse and watery, and exacerbated by meals. He is from [facility] (emigrated in [place]), reports no sick contacts. Presented to an OSH ED for eval of diarrhea, thought to be related to his lactose intolerance. Patient has stopped drinking cow milk, but continues to consume lactose containing products. Denies fevers, chills, weight loss.\par \par #HCM\par General Health: Reports health is "good", however chronic diarrhea impairs his quality of life\par Social history: Lives with female friend, not sexually active, independently attends to ADLs, no EtOH, no tobacco\par Risk: reports no fam hx of cancer\par Cancer screening: N/A\par Immunization: Will administer Flu vaccine today

## 2019-01-30 NOTE — REVIEW OF SYSTEMS
[Vision Problems] : vision problems [Diarrhea] : diarrhea [Confusion] : confusion [Unsteady Walk] : ataxia [Memory Loss] : memory loss [Negative] : Heme/Lymph [Constipation] : no constipation

## 2019-01-30 NOTE — ASSESSMENT
[FreeTextEntry1] : 37 yo M PMHx of TBI, cholecystectomy, presenting for CPE, also for evaluation of chronic diarrhea\par \par #Diarrhea, chronic \par - Patient with profuse diarrhea 6-8 BM daily\par - f/u Stool culture\par - CBC w/diff\par - Stool OVA and parasite\par - f/u transglutaminase IGG to r/o celiac \par - f/u HIV\par - GI referral\par \par #HCM \par - f/u CMP, BMP, TSH, CBC\par - influenza administration this presentation\par RTC in 5 weeks\par \par Discussed with Dr. Sibley\par Bony Castillo MD \par Internal Medicine PGY1

## 2019-02-07 LAB
CULTURE RESULTS: SIGNIFICANT CHANGE UP
CULTURE RESULTS: SIGNIFICANT CHANGE UP
SPECIMEN SOURCE: SIGNIFICANT CHANGE UP
SPECIMEN SOURCE: SIGNIFICANT CHANGE UP
TTG IGA SER-ACNC: <5 UNITS — SIGNIFICANT CHANGE UP
TTG IGA SER-ACNC: NEGATIVE — SIGNIFICANT CHANGE UP
TTG IGG SER IA-ACNC: NEGATIVE — SIGNIFICANT CHANGE UP
TTG IGG SER-ACNC: <5 UNITS — SIGNIFICANT CHANGE UP

## 2019-02-08 LAB
ALBUMIN SERPL ELPH-MCNC: 4.7 G/DL
ALP BLD-CCNC: 38 U/L
ALT SERPL-CCNC: 16 U/L
ANION GAP SERPL CALC-SCNC: 12 MMOL/L
AST SERPL-CCNC: 13 U/L
BASOPHILS # BLD AUTO: 0.02 K/UL
BASOPHILS NFR BLD AUTO: 0.4 %
BILIRUB SERPL-MCNC: 0.4 MG/DL
BUN SERPL-MCNC: 7 MG/DL
CALCIUM SERPL-MCNC: 9.7 MG/DL
CHLORIDE SERPL-SCNC: 104 MMOL/L
CO2 SERPL-SCNC: 24 MMOL/L
CREAT SERPL-MCNC: 0.72 MG/DL
EOSINOPHIL # BLD AUTO: 0.26 K/UL
EOSINOPHIL NFR BLD AUTO: 5 %
GLUCOSE SERPL-MCNC: 95 MG/DL
HBV CORE IGG+IGM SER QL: NONREACTIVE
HBV CORE IGM SER QL: NONREACTIVE
HBV SURFACE AB SER QL: NONREACTIVE
HBV SURFACE AB SERPL IA-ACNC: <3 MIU/ML
HBV SURFACE AG SER QL: NONREACTIVE
HCT VFR BLD CALC: 41.5 %
HGB BLD-MCNC: 13.7 G/DL
HIV1+2 AB SPEC QL IA.RAPID: NONREACTIVE
IMM GRANULOCYTES NFR BLD AUTO: 0.4 %
LYMPHOCYTES # BLD AUTO: 1.76 K/UL
LYMPHOCYTES NFR BLD AUTO: 33.5 %
MAN DIFF?: NORMAL
MCHC RBC-ENTMCNC: 27.8 PG
MCHC RBC-ENTMCNC: 33 GM/DL
MCV RBC AUTO: 84.2 FL
MONOCYTES # BLD AUTO: 0.35 K/UL
MONOCYTES NFR BLD AUTO: 6.7 %
NEUTROPHILS # BLD AUTO: 2.84 K/UL
NEUTROPHILS NFR BLD AUTO: 54 %
PLATELET # BLD AUTO: 218 K/UL
POTASSIUM SERPL-SCNC: 3.9 MMOL/L
PROT SERPL-MCNC: 7.7 G/DL
RBC # BLD: 4.93 M/UL
RBC # FLD: 12.9 %
SODIUM SERPL-SCNC: 140 MMOL/L
TSH SERPL-ACNC: 1.28 UIU/ML
WBC # FLD AUTO: 5.25 K/UL

## 2019-02-11 ENCOUNTER — TRANSCRIPTION ENCOUNTER (OUTPATIENT)
Age: 39
End: 2019-02-11

## 2019-02-12 ENCOUNTER — EMERGENCY (EMERGENCY)
Facility: HOSPITAL | Age: 39
LOS: 1 days | Discharge: HOME | End: 2019-02-12
Attending: STUDENT IN AN ORGANIZED HEALTH CARE EDUCATION/TRAINING PROGRAM
Payer: SELF-PAY

## 2019-02-12 ENCOUNTER — APPOINTMENT (OUTPATIENT)
Dept: GASTROENTEROLOGY | Facility: HOSPITAL | Age: 39
End: 2019-02-12

## 2019-02-12 VITALS
SYSTOLIC BLOOD PRESSURE: 116 MMHG | RESPIRATION RATE: 18 BRPM | WEIGHT: 220.02 LBS | DIASTOLIC BLOOD PRESSURE: 71 MMHG | OXYGEN SATURATION: 96 % | HEART RATE: 78 BPM | HEIGHT: 66 IN | TEMPERATURE: 98 F

## 2019-02-12 PROCEDURE — 99283 EMERGENCY DEPT VISIT LOW MDM: CPT

## 2019-02-12 PROCEDURE — 99284 EMERGENCY DEPT VISIT MOD MDM: CPT | Mod: 25

## 2019-02-12 PROCEDURE — T1013: CPT

## 2019-02-12 PROCEDURE — 99053 MED SERV 10PM-8AM 24 HR FAC: CPT
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
## 2019-02-27 ENCOUNTER — TRANSCRIPTION ENCOUNTER (OUTPATIENT)
Age: 39
End: 2019-02-27

## 2019-02-27 ENCOUNTER — APPOINTMENT (OUTPATIENT)
Dept: INTERNAL MEDICINE | Facility: CLINIC | Age: 39
End: 2019-02-27

## 2019-02-27 VITALS
WEIGHT: 219 LBS | DIASTOLIC BLOOD PRESSURE: 80 MMHG | HEIGHT: 66 IN | SYSTOLIC BLOOD PRESSURE: 130 MMHG | BODY MASS INDEX: 35.2 KG/M2

## 2019-02-27 RX ORDER — LOPERAMIDE HYDROCHLORIDE 2 MG/1
2 CAPSULE ORAL 4 TIMES DAILY
Qty: 30 | Refills: 1 | Status: ACTIVE | COMMUNITY
Start: 2019-02-27 | End: 1900-01-01

## 2019-02-27 NOTE — PHYSICAL EXAM

## 2019-03-05 NOTE — ASSESSMENT
[FreeTextEntry1] : 37 yo M PMHx of TBI, cholecystectomy, chronic diarrhea. Patient has a permanent disability s/p TBI presents for follow up of chronic diarrhea work up\par \par #Chronic diarrhea- unclear etiology: DDx includes lactose intolerance vs IBS\par - Unlikely infectious given neg Stool culture, O/P\par - reportedly has a GI appointment in March per patient \par - Immodium prn for diarrhea\par - Will agree to abstian from dairy, lactaid OTC prn if non-compliant\par \par #TBI with memory defecits\par - Neurology referral given \par \par Case discussed with Dr. Sibley\par Bony Castillo MD\par Internal Medicine, PGY1

## 2019-03-05 NOTE — HISTORY OF PRESENT ILLNESS
[Friend] : friend [FreeTextEntry1] : Chronic diarrhea [de-identified] : 39 yo M PMHx of TBI, cholecystectomy, chronic diarrhea. Patient has a permanent disability s/p TBI presents for follow up of chronic diarrhea work up. Stool culture, O/P, Transglutaminase, TSH all within normal limits or neg. Still with 5-6 BMs daily, continues to consume dairy, has not been evaluated by GI as of yet. Additionally, complaining of increased memory deficits s/p head trauma.

## 2019-03-26 ENCOUNTER — APPOINTMENT (OUTPATIENT)
Dept: GASTROENTEROLOGY | Facility: HOSPITAL | Age: 39
End: 2019-03-26
Payer: COMMERCIAL

## 2019-03-26 ENCOUNTER — OUTPATIENT (OUTPATIENT)
Dept: OUTPATIENT SERVICES | Facility: HOSPITAL | Age: 39
LOS: 1 days | End: 2019-03-26
Payer: SELF-PAY

## 2019-03-26 VITALS
WEIGHT: 215 LBS | HEIGHT: 66 IN | RESPIRATION RATE: 14 BRPM | DIASTOLIC BLOOD PRESSURE: 69 MMHG | HEART RATE: 64 BPM | SYSTOLIC BLOOD PRESSURE: 112 MMHG | BODY MASS INDEX: 34.55 KG/M2

## 2019-03-26 DIAGNOSIS — K52.9 NONINFECTIVE GASTROENTERITIS AND COLITIS, UNSPECIFIED: ICD-10-CM

## 2019-03-26 DIAGNOSIS — R10.9 UNSPECIFIED ABDOMINAL PAIN: ICD-10-CM

## 2019-03-26 PROCEDURE — G0463: CPT

## 2019-03-26 PROCEDURE — 99203 OFFICE O/P NEW LOW 30 MIN: CPT | Mod: GC

## 2019-03-26 NOTE — END OF VISIT
[] : Fellow [FreeTextEntry3] : As modified and discussed with patient\par [facility]\par Associate Professor of Medicine\par [facility] [employer] [facility] [employer]\par

## 2019-03-26 NOTE — PHYSICAL EXAM
[General Appearance - Alert] : alert [General Appearance - In No Acute Distress] : in no acute distress [Outer Ear] : the ears and nose were normal in appearance [Oropharynx] : the oropharynx was normal [Neck Appearance] : the appearance of the neck was normal [Neck Cervical Mass (___cm)] : no neck mass was observed [Jugular Venous Distention Increased] : there was no jugular-venous distention [Thyroid Diffuse Enlargement] : the thyroid was not enlarged [Thyroid Nodule] : there were no palpable thyroid nodules [Auscultation Breath Sounds / Voice Sounds] : lungs were clear to auscultation bilaterally [Heart Rate And Rhythm] : heart rate was normal and rhythm regular [Heart Sounds] : normal S1 and S2 [Heart Sounds Gallop] : no gallops [Murmurs] : no murmurs [Heart Sounds Pericardial Friction Rub] : no pericardial rub [Full Pulse] : the pedal pulses are present [Edema] : there was no peripheral edema [Bowel Sounds] : normal bowel sounds [Abdomen Soft] : soft [Abdomen Tenderness] : non-tender [Abdomen Mass (___ Cm)] : no abdominal mass palpated [No CVA Tenderness] : no ~M costovertebral angle tenderness [No Spinal Tenderness] : no spinal tenderness [Abnormal Walk] : normal gait [Nail Clubbing] : no clubbing  or cyanosis of the fingernails [Musculoskeletal - Swelling] : no joint swelling seen [Motor Tone] : muscle strength and tone were normal [Skin Color & Pigmentation] : normal skin color and pigmentation [Skin Turgor] : normal skin turgor [] : no rash [Deep Tendon Reflexes (DTR)] : deep tendon reflexes were 2+ and symmetric [Sensation] : the sensory exam was normal to light touch and pinprick [No Focal Deficits] : no focal deficits [Oriented To Time, Place, And Person] : oriented to person, place, and time [Impaired Insight] : insight and judgment were intact [Affect] : the affect was normal

## 2019-03-26 NOTE — HISTORY OF PRESENT ILLNESS
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 \par \par As per the patient, he has had on and off abdominal pain, in the epigastrium for 8 months. This is worsened with food consumption. It is sharp, cramping, non radiating. He also has nausea but no vomiting. He complains of diarrhea with >5 BMs a day. He is unsure if he saw blood in the stool. \par He denies weight loss or loss of appetite. \par \par He states that he has 2 anus but on my rectal exam today, I could not appreciate it. \par Stool culture: negative\par Celiac panel: negative

## 2019-03-26 NOTE — ASSESSMENT
[FreeTextEntry1] : IMPRESSION\par - Abdominal pain: Ddx: PUD, GERD, H pylori gastritis, does not have a gallbladder \par - Chronic Diarrhea: DDx IBD, microscopic colitis, celiac disease, IBS-D, SIBO, lactose intolerance, chronic infectious\par - Obesity: BMI 34\par \par RECOMMENDATION\par \par - recommend upper endoscopy (duodenal biopsies) and colonoscopy (biopsies for microscopic colitis)\par - prep with magnesium citrate and Dulcolax, need pre procedure clearance \par - recommend weight loss with diet and exercise \par \par Return to clinic in 2 weeks post procedure

## 2019-04-02 LAB — BACTERIA STL CULT: NORMAL

## 2019-04-16 NOTE — ED PROVIDER NOTE - NS ED ATTENDING STATEMENT MOD
Patient refusing prevalon boots- will allow pillow under legs. Patient has been informed and educated on Importance of prevalon boots in preventing DTI/Pressure ulcers to heels. Verbalized understanding of risk to skin breakdown if not wearing boots. There is redness to right heel, patient did allow RN to place pillow under legs to allow heel to be offloaded from bed for now. Attending Only

## 2019-05-10 ENCOUNTER — OUTPATIENT (OUTPATIENT)
Dept: OUTPATIENT SERVICES | Facility: HOSPITAL | Age: 39
LOS: 1 days | End: 2019-05-10
Payer: SELF-PAY

## 2019-05-10 ENCOUNTER — APPOINTMENT (OUTPATIENT)
Dept: INTERNAL MEDICINE | Facility: CLINIC | Age: 39
End: 2019-05-10

## 2019-05-10 VITALS
HEART RATE: 66 BPM | HEIGHT: 66 IN | OXYGEN SATURATION: 96 % | DIASTOLIC BLOOD PRESSURE: 70 MMHG | SYSTOLIC BLOOD PRESSURE: 120 MMHG | BODY MASS INDEX: 34.55 KG/M2 | WEIGHT: 215 LBS

## 2019-05-10 DIAGNOSIS — S06.9X9S UNSPECIFIED INTRACRANIAL INJURY WITH LOSS OF CONSCIOUSNESS OF UNSPECIFIED DURATION, SEQUELA: ICD-10-CM

## 2019-05-10 DIAGNOSIS — K52.9 NONINFECTIVE GASTROENTERITIS AND COLITIS, UNSPECIFIED: ICD-10-CM

## 2019-05-10 DIAGNOSIS — I10 ESSENTIAL (PRIMARY) HYPERTENSION: ICD-10-CM

## 2019-05-10 PROCEDURE — G0463: CPT

## 2019-05-30 ENCOUNTER — OUTPATIENT (OUTPATIENT)
Dept: OUTPATIENT SERVICES | Facility: HOSPITAL | Age: 39
LOS: 1 days | End: 2019-05-30
Payer: SELF-PAY

## 2019-05-30 ENCOUNTER — APPOINTMENT (OUTPATIENT)
Dept: NEUROLOGY | Facility: HOSPITAL | Age: 39
End: 2019-05-30

## 2019-05-30 VITALS
WEIGHT: 214 LBS | HEIGHT: 66 IN | BODY MASS INDEX: 34.39 KG/M2 | SYSTOLIC BLOOD PRESSURE: 124 MMHG | HEART RATE: 58 BPM | DIASTOLIC BLOOD PRESSURE: 82 MMHG

## 2019-05-30 DIAGNOSIS — R51 HEADACHE: ICD-10-CM

## 2019-05-30 DIAGNOSIS — R56.9 UNSPECIFIED CONVULSIONS: ICD-10-CM

## 2019-05-30 PROCEDURE — G0463: CPT

## 2019-05-30 NOTE — DISCUSSION/SUMMARY
[FreeTextEntry1] : Pt is a 38 year old [facility] male with a past medical history of TBI, chronic diarrhea, cholecystectomy. Accompanied by his girlfirend. In 2007 a tree trunk fell on his head and has had 3 surgeries (craniotomy, etc however unclear exact surgeries). Since then pt has been suffering from headache, memory impairment, cognitive impairments (i.e. registration) He also is complaining of numbness in both of his hands. He also has neck and back pain. he is here for workmans comp. No acute complaints or recent changes. \par \par \par Plan: Once pt establishes care with a workmans comp doctor, may consider getting and MRI brain, MRI c spine as pt has headache, cognitive deficits, subjective numbness/tingling in both hands.\par Would get an HBa1c and lipid panel. Also can recommend over the counter naproxen for headaches. Would take a multivatmin, riboflaven and mg ox.

## 2019-05-30 NOTE — REVIEW OF SYSTEMS
[Vision Problems] : vision problems [Confusion] : confusion [Unsteady Walk] : ataxia [Diarrhea] : diarrhea [Memory Loss] : memory loss [Negative] : Heme/Lymph [Constipation] : no constipation

## 2019-05-30 NOTE — HISTORY OF PRESENT ILLNESS
[FreeTextEntry1] : Pt is a 38 year old [facility] male with a past medical history of TBI, chronic diarrhea, cholecystectomy. Accompanied by his girlfirend. In 2007 a tree trunk fell on his head and has had 3 surgeries (craniotomy, etc however unclear exact surgeries). Since then pt has been suffering from headache, memory impairment, cognitive impairments (i.e. registration) He also is complaining of numbness in both of his hands. He also has neck and back pain. he is here for workmans comp. No acute complaints or recent changes. \par \par Social - unemployed, prior beer drinker, never smoker\par Medications - Anti-diarheal medication

## 2019-05-30 NOTE — REVIEW OF SYSTEMS
[Fever] : no fever [Chills] : no chills [Suicidal] : not suicidal [Sleep Disturbances] : no sleep disturbances [Confused or Disoriented] : no confusion [Memory Lapses or Loss] : no memory loss [Decr. Concentrating Ability] : no decrease in concentrating ability [Difficulty with Language] : no ~M difficulty with language [Changed Thought Patterns] : no change in thought patterns [Repeating Questions] : no repeated questioning about recent events [Seizures] : no convulsions [Dizziness] : no dizziness [Fainting] : no fainting [Lightheadedness] : no lightheadedness [Eye Pain] : no eye pain [Red Eyes] : eyes not red

## 2019-05-30 NOTE — HISTORY OF PRESENT ILLNESS
[FreeTextEntry1] : Pt is a 38 year old [facility] male with a past medical history of TBI, chronic diarrhea, cholecystectomy. Accompanied by his girlfirend. In 2007 a tree trunk fell on his head and has had 3 surgeries (craniotomy, etc however unclear exact surgeries). Since then pt has been suffering from headache, memory impairment, cognitive impairments (i.e. registration) He also is complaining of numbness in both of his hands. He also has neck and back pain. he is here for workmans comp. No acute complaints or recent changes. \par \par Social - unemployed, prior beer drinker, never smoker\par Medications - Anti-diarheal medication\par

## 2019-05-30 NOTE — HISTORY OF PRESENT ILLNESS
[[employer] ] : provided by [employer]   [FreeTextEntry8] : 37 yo M PMHx of TBI memory deficits, cholecystectomy, chronic diarrhea presenting for evaluation to settle his worker's compensation case. His TBI occurred while he was at work, requiring 3 Intracranial procedures since then he has had memory deficits. Worker's compensation requesting a letter stating the patient has capacity to comprehend what a settlement means. Patient states that "a settlement is an agreement between two parties to end a dispute." MME performed in office score was 25 points. [FreeTextEntry1] : [id number]

## 2019-05-30 NOTE — PHYSICAL EXAM
[Person] : oriented to person [Place] : oriented to place [Short Term Intact] : short term memory intact [Remote Intact] : remote memory intact [Span Intact] : the attention span was normal [Concentration Intact] : normal concentrating ability [Naming Objects] : no difficulty naming common objects [Repeating Phrases] : no difficulty repeating a phrase [Fluency] : fluency intact [Cranial Nerves Facial (VII)] : face symmetrical [Sensation Tactile Decrease] : light touch was intact [Sensation Pain / Temperature Decrease] : pain and temperature was intact [Motor Strength Upper Extremities Bilaterally] : strength was normal in both upper extremities [Motor Strength Lower Extremities Bilaterally] : strength was normal in both lower extremities [Limited Balance] : balance was intact [Past-pointing] : there was no past-pointing [Tremor] : no tremor present [FreeTextEntry5] : Mild right nasolabial flattening

## 2019-05-30 NOTE — ASSESSMENT
[FreeTextEntry1] : 39 yo M PMHx of TBI memory deficits, cholecystectomy, chronic diarrhea presenting for evaluation of comprehension to settle his worker's compensation case\par \par #TBI\par - Expressed appropriate definition of settlement\par - MME is 25 therefore patient is able to understand the terms of a settlement agreement\par - MME form scaned to EMR\par - Letter provided to patient\par \par Discussed with Dr. Perdue \par \par Bony Castillo MD\par Internal Medicine PGY1\par

## 2019-05-30 NOTE — ASSESSMENT
[FreeTextEntry1] : Pt is a 38 year old [facility] male with a past medical history of TBI, chronic diarrhea, cholecystectomy. Accompanied by his girlfriend. In 2007 a tree trunk fell on his head and has had 3 surgeries (craniotomy, etc however unclear exact surgeries). Since then pt has been suffering from headache, memory impairment, cognitive impairments (i.e. registration) He also is complaining of numbness in both of his hands. He also has neck and back pain. he is here for workman comp. No acute complaints or recent changes. \par \par \par Plan: Once pt establishes care with a workmans comp doctor, may consider getting and MRI brain, MRI c spine as pt has headache, cognitive deficits, subjective numbness/tingling in both hands.\par Would get an HBa1c and lipid panel. Also can recommend over the counter naproxen for headaches. Would take a multivitamin, riboflavin and mg ox. \par

## 2019-05-30 NOTE — PHYSICAL EXAM
[FreeTextEntry1] : Orientation: oriented to person and oriented to place. \par Memory: short term memory intact and remote memory intact. \par Attention: the attention span was normal and normal concentrating ability. \par Language: no difficulty naming common objects, no difficulty repeating a phrase and fluency intact. \par Cranial Nerves: face symmetrical . Mild right nasolabial flattening. \par Motor Strength:. strength was normal in both upper extremities. strength was normal in both lower extremities. \par Sensory exam: light touch was intact and pain and temperature was intact. \par Coordination:. balance was intact. there was no past-pointing. no tremor present.  \par

## 2019-06-18 ENCOUNTER — APPOINTMENT (OUTPATIENT)
Dept: GASTROENTEROLOGY | Facility: HOSPITAL | Age: 39
End: 2019-06-18

## 2019-06-18 ENCOUNTER — OUTPATIENT (OUTPATIENT)
Dept: OUTPATIENT SERVICES | Facility: HOSPITAL | Age: 39
LOS: 1 days | End: 2019-06-18
Payer: SELF-PAY

## 2019-06-18 VITALS
BODY MASS INDEX: 34.39 KG/M2 | HEIGHT: 66 IN | SYSTOLIC BLOOD PRESSURE: 132 MMHG | WEIGHT: 214 LBS | DIASTOLIC BLOOD PRESSURE: 73 MMHG

## 2019-06-18 DIAGNOSIS — R10.9 UNSPECIFIED ABDOMINAL PAIN: ICD-10-CM

## 2019-06-18 DIAGNOSIS — E66.9 OBESITY, UNSPECIFIED: ICD-10-CM

## 2019-06-18 DIAGNOSIS — K31.9 DISEASE OF STOMACH AND DUODENUM, UNSPECIFIED: ICD-10-CM

## 2019-06-18 PROCEDURE — G0463: CPT

## 2019-06-18 NOTE — PHYSICAL EXAM
[General Appearance - Alert] : alert [General Appearance - In No Acute Distress] : in no acute distress [Outer Ear] : the ears and nose were normal in appearance [Neck Appearance] : the appearance of the neck was normal [Neck Cervical Mass (___cm)] : no neck mass was observed [Oropharynx] : the oropharynx was normal [Jugular Venous Distention Increased] : there was no jugular-venous distention [Thyroid Diffuse Enlargement] : the thyroid was not enlarged [Auscultation Breath Sounds / Voice Sounds] : lungs were clear to auscultation bilaterally [Thyroid Nodule] : there were no palpable thyroid nodules [Heart Rate And Rhythm] : heart rate was normal and rhythm regular [Heart Sounds] : normal S1 and S2 [Heart Sounds Pericardial Friction Rub] : no pericardial rub [Murmurs] : no murmurs [Heart Sounds Gallop] : no gallops [Full Pulse] : the pedal pulses are present [Edema] : there was no peripheral edema [Abdomen Soft] : soft [Abdomen Tenderness] : non-tender [Bowel Sounds] : normal bowel sounds [Abdomen Mass (___ Cm)] : no abdominal mass palpated [No Spinal Tenderness] : no spinal tenderness [No CVA Tenderness] : no ~M costovertebral angle tenderness [Abnormal Walk] : normal gait [Nail Clubbing] : no clubbing  or cyanosis of the fingernails [Motor Tone] : muscle strength and tone were normal [Musculoskeletal - Swelling] : no joint swelling seen [Skin Turgor] : normal skin turgor [Skin Color & Pigmentation] : normal skin color and pigmentation [] : no rash [Sensation] : the sensory exam was normal to light touch and pinprick [Deep Tendon Reflexes (DTR)] : deep tendon reflexes were 2+ and symmetric [No Focal Deficits] : no focal deficits [Oriented To Time, Place, And Person] : oriented to person, place, and time [Affect] : the affect was normal [Impaired Insight] : insight and judgment were intact

## 2019-06-18 NOTE — ASSESSMENT
[FreeTextEntry1] : IMPRESSION\par -  History of abdominal pain and diarrhea, now resolved. \par - Obesity: BMI 34\par \par RECOMMENDATION\par \par - recommend upper endoscopy (duodenal biopsies) and colonoscopy (biopsies for microscopic colitis), patient refusing the procedure, will hold off since the symptoms have resolved\par - recommend weight loss with diet and exercise \par \par Return to clinic in 3 months \par Plan discussed with Dr BEATTY, patient and his girlfiend.

## 2019-06-18 NOTE — END OF VISIT
[] : Fellow [FreeTextEntry3] : As modified and discussed with patient\par MD ROB Sandoval [facility]\par Associate Professor of Medicine\par [employer] [facility] [employer]\par   PROVIDER:[TOKEN:[[id number]]],PROVIDER:[TOKEN:[[id number]]]

## 2019-06-18 NOTE — HISTORY OF PRESENT ILLNESS
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 \par \par PAtient was previously seen in 3/2019 for on and off abdominal pain,and diarrhea with >5 BMs a day. He was scheduled for an endoscopy and a colonoscopy. When he went to the endoscopy unit, he was scared and cancelled the procedures. \par Today, he denies nausea, vomiting, abdominal pain, changes in bowel habits, hematochezia or melena. He denies weight loss or loss of appetite. \par \par Labs reviewed: normal\par Stool culture: negative\par Celiac panel: negative

## 2019-08-19 ENCOUNTER — EMERGENCY (EMERGENCY)
Facility: HOSPITAL | Age: 39
LOS: 1 days | Discharge: HOME | End: 2019-08-19
Attending: EMERGENCY MEDICINE
Payer: SELF-PAY

## 2019-08-19 VITALS
SYSTOLIC BLOOD PRESSURE: 124 MMHG | DIASTOLIC BLOOD PRESSURE: 70 MMHG | OXYGEN SATURATION: 93 % | TEMPERATURE: 98 F | RESPIRATION RATE: 22 BRPM | HEART RATE: 75 BPM

## 2019-08-19 PROCEDURE — 99284 EMERGENCY DEPT VISIT MOD MDM: CPT

## 2019-08-19 RX ORDER — MIDAZOLAM HYDROCHLORIDE 1 MG/ML
2 INJECTION, SOLUTION INTRAMUSCULAR; INTRAVENOUS ONCE
Refills: 0 | Status: DISCONTINUED | OUTPATIENT
Start: 2019-08-19 | End: 2019-08-19

## 2019-08-19 NOTE — ED PROVIDER NOTE - OBJECTIVE STATEMENT
47 y/o M pt presents to the ED BIBA c/o intoxication.  Pt admits to EtOH intake today.  Affect consistent with alcohol intoxication. Pt states that he fell and hit the top of his head. Denies other drug use. No further acute complaints at this time.

## 2019-08-19 NOTE — ED PROVIDER NOTE - MUSCULOSKELETAL, MLM
No signs of trauma. Spine appears normal, range of motion is not limited, no muscle or joint tenderness

## 2019-08-19 NOTE — ED PROVIDER NOTE - CLINICAL SUMMARY MEDICAL DECISION MAKING FREE TEXT BOX
Pt presenting with alcohol intoxication, possible head trauma. No signs of trauma, will obtain ct head and observe for sobriety.

## 2019-08-19 NOTE — ED ADULT NURSE NOTE - OBJECTIVE STATEMENT
pt lying on the stretcher in a yellow gown. pt is alert and disoriented, with slurred speech and alcohol on breath

## 2019-08-19 NOTE — ED PROVIDER NOTE - PROGRESS NOTE DETAILS
AJM: Ct head neg. stable for dc home .clincally sober AJM: Ct head neg. arousable. not yet safe for dc. signed out to dr jones Ariella DUCKWORTH- pt feels better, pt walking around, pt discharged home

## 2019-08-19 NOTE — ED ADULT TRIAGE NOTE - CHIEF COMPLAINT QUOTE
pt BIBA, ETOH combative and verbally assaultive to staff. MD Hidalgo called to bedside for eval. pt placed in yellow gown, belongings placed in secure labeled bag.

## 2019-08-20 VITALS
RESPIRATION RATE: 18 BRPM | DIASTOLIC BLOOD PRESSURE: 62 MMHG | OXYGEN SATURATION: 98 % | HEART RATE: 77 BPM | TEMPERATURE: 98 F | SYSTOLIC BLOOD PRESSURE: 100 MMHG

## 2019-08-20 PROCEDURE — 70450 CT HEAD/BRAIN W/O DYE: CPT | Mod: 26

## 2019-08-20 PROCEDURE — 99285 EMERGENCY DEPT VISIT HI MDM: CPT | Mod: 25

## 2019-08-20 PROCEDURE — 70450 CT HEAD/BRAIN W/O DYE: CPT

## 2019-08-20 RX ORDER — MIDAZOLAM HYDROCHLORIDE 1 MG/ML
2 INJECTION, SOLUTION INTRAMUSCULAR; INTRAVENOUS ONCE
Refills: 0 | Status: DISCONTINUED | OUTPATIENT
Start: 2019-08-20 | End: 2019-08-20

## 2019-08-20 RX ADMIN — MIDAZOLAM HYDROCHLORIDE 2 MILLIGRAM(S): 1 INJECTION, SOLUTION INTRAMUSCULAR; INTRAVENOUS at 00:36

## 2019-08-20 RX ADMIN — MIDAZOLAM HYDROCHLORIDE 2 MILLIGRAM(S): 1 INJECTION, SOLUTION INTRAMUSCULAR; INTRAVENOUS at 03:26

## 2019-08-20 NOTE — ED ADULT NURSE REASSESSMENT NOTE - NS ED NURSE REASSESS COMMENT FT1
pt lying on the stretcher in a yellow gown, pt becoming agitated requesting his property. deescalation techniques initiated. MD made aware. will continue to monitor and reassess pt sleeping on the stretcher in a yellow gown. pt arousable to painful stimuli. pt is disoriented and falling asleep during evaluation. MD made aware. will continue to monitor and reasses

## 2019-08-20 NOTE — ED ADULT NURSE REASSESSMENT NOTE - NS ED NURSE REASSESS COMMENT FT1
pt sleeping on the stretcher in a yellow gown. pt is arousable to verbal stimuli, pt is disoriented and has slurred speech. pt denies any pain or discomfort. will continue to monitor and reassess

## 2019-08-20 NOTE — ED ADULT NURSE REASSESSMENT NOTE - NS ED NURSE REASSESS COMMENT FT1
Report received from off going RN, pt currently oob and in the bathroom, ambulating with steady gait. wearing yellow gown.  CIWA scale 0, denies any c/o at this time, reports he knows he drinks too much". refusing assistance at this time.

## 2019-08-21 ENCOUNTER — EMERGENCY (EMERGENCY)
Facility: HOSPITAL | Age: 39
LOS: 1 days | Discharge: HOME | End: 2019-08-21
Attending: EMERGENCY MEDICINE
Payer: SELF-PAY

## 2019-08-21 VITALS — WEIGHT: 164.91 LBS | HEIGHT: 68 IN

## 2019-08-21 VITALS
HEART RATE: 68 BPM | DIASTOLIC BLOOD PRESSURE: 73 MMHG | SYSTOLIC BLOOD PRESSURE: 116 MMHG | RESPIRATION RATE: 20 BRPM | TEMPERATURE: 98 F | OXYGEN SATURATION: 98 %

## 2019-08-21 PROCEDURE — 90715 TDAP VACCINE 7 YRS/> IM: CPT

## 2019-08-21 PROCEDURE — 73630 X-RAY EXAM OF FOOT: CPT

## 2019-08-21 PROCEDURE — 99283 EMERGENCY DEPT VISIT LOW MDM: CPT | Mod: 25

## 2019-08-21 PROCEDURE — 73630 X-RAY EXAM OF FOOT: CPT | Mod: 26,RT

## 2019-08-21 PROCEDURE — 90471 IMMUNIZATION ADMIN: CPT

## 2019-08-21 PROCEDURE — 99284 EMERGENCY DEPT VISIT MOD MDM: CPT

## 2019-08-21 RX ORDER — TETANUS TOXOID, REDUCED DIPHTHERIA TOXOID AND ACELLULAR PERTUSSIS VACCINE, ADSORBED 5; 2.5; 8; 8; 2.5 [IU]/.5ML; [IU]/.5ML; UG/.5ML; UG/.5ML; UG/.5ML
0.5 SUSPENSION INTRAMUSCULAR ONCE
Refills: 0 | Status: COMPLETED | OUTPATIENT
Start: 2019-08-21 | End: 2019-08-21

## 2019-08-21 RX ORDER — CIPROFLOXACIN LACTATE 400MG/40ML
500 VIAL (ML) INTRAVENOUS ONCE
Refills: 0 | Status: COMPLETED | OUTPATIENT
Start: 2019-08-21 | End: 2019-08-21

## 2019-08-21 RX ORDER — MOXIFLOXACIN HYDROCHLORIDE TABLETS, 400 MG 400 MG/1
1 TABLET, FILM COATED ORAL
Qty: 10 | Refills: 0
Start: 2019-08-21 | End: 2019-08-25

## 2019-08-21 RX ADMIN — Medication 500 MILLIGRAM(S): at 18:36

## 2019-08-21 RX ADMIN — TETANUS TOXOID, REDUCED DIPHTHERIA TOXOID AND ACELLULAR PERTUSSIS VACCINE, ADSORBED 0.5 MILLILITER(S): 5; 2.5; 8; 8; 2.5 SUSPENSION INTRAMUSCULAR at 18:35

## 2019-08-21 NOTE — ED STATDOCS - CARE PLAN
Principal Discharge DX:	Puncture wound  Assessment and plan of treatment:	1. Return to ED for worsening, progressive or any other concerning symptoms   2. Follow up with your primary care doctor in 2-3days   3. Take motrin 600mg every 6 hours as needed for pain and Take Tylenol up to 650 mg every 6 hours as needed for pain.   4. Apply bacitracin twice a day   5. Take 500mg of Ciprofloxacin twice a day for 5days. Do not participate in any high impact activities while on this medication

## 2019-08-21 NOTE — ED STATDOCS - PHYSICAL EXAMINATION
Gen: NAD, AOx3  Head: NCAT  HEENT: EOMI, oral mucosa moist, normal conjunctiva, neck supple  Lung: no respiratory distress  CV:  Normal perfusion  Abd: soft, NTND  MSK: Discrete puncture wound dorsal aspect between 2nd and 3rd metatarsal.  Neuro: No focal neurologic deficits  Skin: No rash   Psych: normal affect Gen: NAD, AOx3  Head: NCAT  HEENT: EOMI, oral mucosa moist, normal conjunctiva, neck supple  Lung: no respiratory distress  CV:  Normal perfusion  MSK: 3mm puncture wound dorsal aspect between 2nd and 3rd metatarsal.  Neuro: No focal neurologic deficits  Skin: No rash   Psych: normal affect

## 2019-08-21 NOTE — ED ADULT NURSE NOTE - OBJECTIVE STATEMENT
Pt A&OX3, amb ad calli, states he picked a wooden board on the street that had nails and accidently hit posterior part of left foot with one of the nails on the board, today.  Small puncture wound noted on left posterior part of foot.  NO active bleeding noted.

## 2019-08-21 NOTE — ED STATDOCS - NS_ ATTENDINGSCRIBEDETAILS _ED_A_ED_FT
I, Alessandra Kimball, performed the initial face to face bedside interview with this patient regarding history of present illness, review of symptoms and relevant past medical, social and family history.  I completed an independent physical examination.  The history, relevant review of systems, past medical and surgical history, medical decision making, and physical examination was documented by the scribe in my presence and I attest to the accuracy of the documentation.

## 2019-08-21 NOTE — ED STATDOCS - NS ED ROS FT
ROS: no CP/SOB. no cough. no fever. no n/v/d/c. no abd pain. no rash. no bleeding. no urinary complaints. no weakness. no vision changes. no HA. no neck/back pain. no extremity swelling. No change in mental status.     (+) puncture wound no fever  no rash   no bleeding  (+) puncture wound

## 2019-08-21 NOTE — ED ADULT NURSE NOTE - CAS EDN DISCHARGE ASSESSMENT
DSD applied/Dressing clean and dry/Alert and oriented to person, place and time/Patient baseline mental status/Awake

## 2019-08-21 NOTE — ED PROCEDURE NOTE - PROCEDURE ADDITIONAL DETAILS
Puncture wound dorsum of foot approximately 3 millimeters, clean garrison copious irrigation using normal saline, also cleaned with Betadine, no closure. Instruction given for care of wound and sign of infection.

## 2019-08-21 NOTE — ED STATDOCS - OBJECTIVE STATEMENT
39 y/o M pt with no significant PMHx presents to the ED c/o puncture wound to R foot onset today. Pt reports he was walking and stepped on a nail with his R foot. He says that the whole nail when through his foot. He does not know when his last Tetanus was. Denies any known allergies or washing wound s/p injury. No further acute complaints at this time.

## 2019-08-21 NOTE — ED STATDOCS - PLAN OF CARE
1. Return to ED for worsening, progressive or any other concerning symptoms   2. Follow up with your primary care doctor in 2-3days   3. Take motrin 600mg every 6 hours as needed for pain and Take Tylenol up to 650 mg every 6 hours as needed for pain.   4. Apply bacitracin twice a day   5. Take 500mg of Ciprofloxacin twice a day for 5days. Do not participate in any high impact activities while on this medication

## 2019-08-21 NOTE — ED STATDOCS - CLINICAL SUMMARY MEDICAL DECISION MAKING FREE TEXT BOX
Pt with puncture wound. Per chart review, pt here for intoxication recently. Pt a poor historian, not a diabetic. No puncture wound on bottom of foot, will get X-ray, Tetanus, and Cipro.

## 2019-08-21 NOTE — ED ADULT TRIAGE NOTE - CHIEF COMPLAINT QUOTE
Pt stepped on a nail with his right foot that he pulled back out. Pt c/o pain and asking for a tetanus shot.

## 2019-09-03 ENCOUNTER — APPOINTMENT (OUTPATIENT)
Dept: GASTROENTEROLOGY | Facility: HOSPITAL | Age: 39
End: 2019-09-03

## 2019-09-04 PROBLEM — Z78.9 OTHER SPECIFIED HEALTH STATUS: Chronic | Status: ACTIVE | Noted: 2019-08-20

## 2019-10-24 ENCOUNTER — EMERGENCY (EMERGENCY)
Facility: HOSPITAL | Age: 39
LOS: 1 days | Discharge: HOME | End: 2019-10-24
Attending: STUDENT IN AN ORGANIZED HEALTH CARE EDUCATION/TRAINING PROGRAM
Payer: SELF-PAY

## 2019-10-24 VITALS
OXYGEN SATURATION: 93 % | WEIGHT: 210.1 LBS | TEMPERATURE: 98 F | SYSTOLIC BLOOD PRESSURE: 105 MMHG | DIASTOLIC BLOOD PRESSURE: 72 MMHG | HEART RATE: 78 BPM | HEIGHT: 66 IN | RESPIRATION RATE: 16 BRPM

## 2019-10-24 PROCEDURE — 99284 EMERGENCY DEPT VISIT MOD MDM: CPT

## 2019-10-24 PROCEDURE — 99053 MED SERV 10PM-8AM 24 HR FAC: CPT

## 2019-10-24 NOTE — ED ADULT TRIAGE NOTE - CHIEF COMPLAINT QUOTE
Patient picked up at [facility] after drinking all day as per EMS, pt without injury but c/o left knee pain, no swelling, pt able to move joint freely- has soft knee brace in place.  EMS states pt ambulated to stretcher without difficulty on scene but is requiring assistance and refusing to bare weight. Pt changed into yellow gown, belongings to .

## 2019-10-25 ENCOUNTER — MESSAGE (OUTPATIENT)
Age: 39
End: 2019-10-25

## 2019-10-25 VITALS
OXYGEN SATURATION: 95 % | SYSTOLIC BLOOD PRESSURE: 111 MMHG | HEART RATE: 84 BPM | RESPIRATION RATE: 18 BRPM | DIASTOLIC BLOOD PRESSURE: 78 MMHG

## 2019-10-25 PROCEDURE — 99285 EMERGENCY DEPT VISIT HI MDM: CPT

## 2019-10-25 PROCEDURE — 82962 GLUCOSE BLOOD TEST: CPT

## 2019-10-25 NOTE — ED PROVIDER NOTE - PMH
Alcohol abuse    No pertinent past medical history    Seizure    Skull fracture    Traumatic brain injury

## 2019-10-25 NOTE — ED PROVIDER NOTE - NS ED ROS FT
Gen: +Intoxication. denies weakness, malaise/fatigue, fever, chills  Skin: denies rashes, hives   HEENT: denies headaches, LOC, visual changes, congestion  Respiratory: denies cough, dyspnea, REYNA, SOB, wheezing  Cardiovascular: denies chest pain, palpitations, diaphoresis, edema  GI: denies abdominal pain, nausea/vomiting, diarrhea/constipation  : denies dysuria, hematuria, frequency, urgency, hesitancy, incontinence of bowel/bladder  MSK: +Left knee pain. denies limitation on movement, weakness, joint swelling/redness/warmth  Neuro: denies LOC, seizures, syncope, headache, dizziness, vertigo, numbness/tingling  Psych: denies irritability, mood swings, anxiety, depression, SI/HI, visual/auditory hallucinations

## 2019-10-25 NOTE — ED ADULT NURSE REASSESSMENT NOTE - NS ED NURSE REASSESS COMMENT FT1
pt able to tolerate po's. pt ambulates appropriately without difficulty. vss. pt agreeable to dc at this time. md aware.

## 2019-10-25 NOTE — ED ADULT NURSE NOTE - CHIEF COMPLAINT QUOTE
Patient picked up at [facility] after drinking all day as per EMS, pt without injury but c/o left knee pain, no swelling, pt able to move joint freely- has soft knee brace in place.  EMS states pt ambulated to stretcher without difficulty on scene but is requiring assistance and refusing to bare weight. Pt changed into yellow gown, belongings to .

## 2019-10-25 NOTE — ED PROVIDER NOTE - ATTENDING CONTRIBUTION TO CARE
I personally saw the patient with the PA, and completed the key components of the history and physical exam. I then discussed the management plan with the PA.    pt with pmh of seizure do and etoh abuse presents intox with left knee pain. Pt ambulatory, FROM, mildly ttp no obvious deformity. Neg drawer test, Stable knee, once clincally sober, no compliaints, stable for dc

## 2019-10-25 NOTE — ED PROVIDER NOTE - PHYSICAL EXAMINATION
Const: Awake, alert and oriented. In no acute distress. Well appearing.  HEENT: NC/AT. Moist mucous membranes.  Eyes: No scleral icterus. PERRLA. EOMI.  Neck:. Soft and supple. Full ROM without pain. No midline c-spine ttp.   Cardiac: Regular rate and regular rhythm. +S1/S2. Peripheral pulses 2+ and symmetric. No LE edema.  Resp: Speaking in full sentences. No evidence of respiratory distress. No wheezes, rales or rhonchi.  Abd: Soft, non-tender, non-distended. Normal bowel sounds in all 4 quadrants. No guarding or rebound.  Back: Spine midline and non-tender. No CVAT.  MSK: LLE- no obvious deformity. FROM knee b/l. Mildly ttp joint line. DP pulses 2+ b/l   Skin: +Thickened toenails. No rashes, abrasions or lacerations.  Neuro: Awake, alert & oriented x 3. Arousable to voice. Moves all extremities symmetrically. Sensorimotor intact x 4. Ambulatory with steady gait

## 2019-10-25 NOTE — ED PROVIDER NOTE - OBJECTIVE STATEMENT
40yo male pmhx seizure disorder presents to ED BIBEMS for intoxication and left knee pain. As per pt, he called ambulance to bring himself to hospital for evaluation of left knee pain x 5 days, no known injury or trauma. As per EMS [place], brought to ED for intoxication. Pt ambulated to EMS stretcher. Pt ambulatory in ED with steady gait. Pt admits to consuming 3 beers, no drug use. Pt denies LOC, fall, states he remembers all the events of this evening and yesterday. No further complaints. Denies headache, fall, LOC, CP, SOB.

## 2019-10-25 NOTE — ED PROVIDER NOTE - PROGRESS NOTE DETAILS
Patient seen and reassessed.   Patient is AAOX3, NAD, able to ambulate, non tremulous, and tolerating oral intake. VSS. Patient states that they feel safe going home and have a safe way to get home. Discussed need to cut down on alcohol intake, given literature.

## 2019-10-25 NOTE — ED PROVIDER NOTE - CLINICAL SUMMARY MEDICAL DECISION MAKING FREE TEXT BOX
Pt in ED clinically intoxicated complaining of left knee pain, offering no further complaints. Ambulatory in ED. Pt arousable and in NAD. VSS. No physical signs of injury. Will re-assess in AM and d/c upon clinical sobriety.

## 2019-10-25 NOTE — ED PROVIDER NOTE - PATIENT PORTAL LINK FT
You can access the FollowMyHealth Patient Portal offered by [facility] by registering at the following website: [email or web address]. By joining [employer]’s FollowMyHealth portal, you will also be able to view your health information using other applications (apps) compatible with our system.

## 2019-10-25 NOTE — ED ADULT NURSE NOTE - OBJECTIVE STATEMENT
pt triaged, treated and dispo by provider, pt verbalized understanding of discharge instructions, refer to provider notes..

## 2019-10-27 ENCOUNTER — EMERGENCY (EMERGENCY)
Facility: HOSPITAL | Age: 39
LOS: 1 days | Discharge: HOME | End: 2019-10-27
Attending: EMERGENCY MEDICINE
Payer: SELF-PAY

## 2019-10-27 VITALS
DIASTOLIC BLOOD PRESSURE: 65 MMHG | WEIGHT: 199.96 LBS | OXYGEN SATURATION: 99 % | TEMPERATURE: 99 F | HEIGHT: 68 IN | SYSTOLIC BLOOD PRESSURE: 105 MMHG | RESPIRATION RATE: 18 BRPM | HEART RATE: 67 BPM

## 2019-10-27 DIAGNOSIS — R51 HEADACHE: ICD-10-CM

## 2019-10-27 DIAGNOSIS — H53.8 OTHER VISUAL DISTURBANCES: ICD-10-CM

## 2019-10-27 DIAGNOSIS — Z88.8 ALLERGY STATUS TO OTHER DRUGS, MEDICAMENTS AND BIOLOGICAL SUBSTANCES STATUS: ICD-10-CM

## 2019-10-27 LAB
ALBUMIN SERPL ELPH-MCNC: 4 G/DL — SIGNIFICANT CHANGE UP (ref 3.3–5.2)
ALP SERPL-CCNC: 42 U/L — SIGNIFICANT CHANGE UP (ref 40–120)
ALT FLD-CCNC: 19 U/L — SIGNIFICANT CHANGE UP
ANION GAP SERPL CALC-SCNC: 13 MMOL/L — SIGNIFICANT CHANGE UP (ref 5–17)
AST SERPL-CCNC: 17 U/L — SIGNIFICANT CHANGE UP
BASOPHILS # BLD AUTO: 0.02 K/UL — SIGNIFICANT CHANGE UP (ref 0–0.2)
BASOPHILS NFR BLD AUTO: 0.3 % — SIGNIFICANT CHANGE UP (ref 0–2)
BILIRUB SERPL-MCNC: 0.9 MG/DL — SIGNIFICANT CHANGE UP (ref 0.4–2)
BUN SERPL-MCNC: 13 MG/DL — SIGNIFICANT CHANGE UP (ref 8–20)
CALCIUM SERPL-MCNC: 8.7 MG/DL — SIGNIFICANT CHANGE UP (ref 8.6–10.2)
CHLORIDE SERPL-SCNC: 103 MMOL/L — SIGNIFICANT CHANGE UP (ref 98–107)
CO2 SERPL-SCNC: 23 MMOL/L — SIGNIFICANT CHANGE UP (ref 22–29)
CREAT SERPL-MCNC: 0.66 MG/DL — SIGNIFICANT CHANGE UP (ref 0.5–1.3)
EOSINOPHIL # BLD AUTO: 0.37 K/UL — SIGNIFICANT CHANGE UP (ref 0–0.5)
EOSINOPHIL NFR BLD AUTO: 5.7 % — SIGNIFICANT CHANGE UP (ref 0–6)
ETHANOL SERPL-MCNC: <10 MG/DL — SIGNIFICANT CHANGE UP
GLUCOSE SERPL-MCNC: 103 MG/DL — SIGNIFICANT CHANGE UP (ref 70–115)
HCT VFR BLD CALC: 39.5 % — SIGNIFICANT CHANGE UP (ref 39–50)
HGB BLD-MCNC: 13 G/DL — SIGNIFICANT CHANGE UP (ref 13–17)
IMM GRANULOCYTES NFR BLD AUTO: 0.2 % — SIGNIFICANT CHANGE UP (ref 0–1.5)
LYMPHOCYTES # BLD AUTO: 2.21 K/UL — SIGNIFICANT CHANGE UP (ref 1–3.3)
LYMPHOCYTES # BLD AUTO: 34 % — SIGNIFICANT CHANGE UP (ref 13–44)
MCHC RBC-ENTMCNC: 28 PG — SIGNIFICANT CHANGE UP (ref 27–34)
MCHC RBC-ENTMCNC: 32.9 GM/DL — SIGNIFICANT CHANGE UP (ref 32–36)
MCV RBC AUTO: 85.1 FL — SIGNIFICANT CHANGE UP (ref 80–100)
MONOCYTES # BLD AUTO: 0.53 K/UL — SIGNIFICANT CHANGE UP (ref 0–0.9)
MONOCYTES NFR BLD AUTO: 8.2 % — SIGNIFICANT CHANGE UP (ref 2–14)
NEUTROPHILS # BLD AUTO: 3.36 K/UL — SIGNIFICANT CHANGE UP (ref 1.8–7.4)
NEUTROPHILS NFR BLD AUTO: 51.6 % — SIGNIFICANT CHANGE UP (ref 43–77)
PLATELET # BLD AUTO: 199 K/UL — SIGNIFICANT CHANGE UP (ref 150–400)
POTASSIUM SERPL-MCNC: 3.6 MMOL/L — SIGNIFICANT CHANGE UP (ref 3.5–5.3)
POTASSIUM SERPL-SCNC: 3.6 MMOL/L — SIGNIFICANT CHANGE UP (ref 3.5–5.3)
PROT SERPL-MCNC: 7.1 G/DL — SIGNIFICANT CHANGE UP (ref 6.6–8.7)
RBC # BLD: 4.64 M/UL — SIGNIFICANT CHANGE UP (ref 4.2–5.8)
RBC # FLD: 11.9 % — SIGNIFICANT CHANGE UP (ref 10.3–14.5)
SODIUM SERPL-SCNC: 139 MMOL/L — SIGNIFICANT CHANGE UP (ref 135–145)
WBC # BLD: 6.5 K/UL — SIGNIFICANT CHANGE UP (ref 3.8–10.5)
WBC # FLD AUTO: 6.5 K/UL — SIGNIFICANT CHANGE UP (ref 3.8–10.5)

## 2019-10-27 PROCEDURE — 96374 THER/PROPH/DIAG INJ IV PUSH: CPT

## 2019-10-27 PROCEDURE — 70450 CT HEAD/BRAIN W/O DYE: CPT | Mod: 26

## 2019-10-27 PROCEDURE — 80307 DRUG TEST PRSMV CHEM ANLYZR: CPT

## 2019-10-27 PROCEDURE — 99284 EMERGENCY DEPT VISIT MOD MDM: CPT | Mod: 25

## 2019-10-27 PROCEDURE — 70450 CT HEAD/BRAIN W/O DYE: CPT

## 2019-10-27 PROCEDURE — 36415 COLL VENOUS BLD VENIPUNCTURE: CPT

## 2019-10-27 PROCEDURE — 99284 EMERGENCY DEPT VISIT MOD MDM: CPT

## 2019-10-27 PROCEDURE — 73564 X-RAY EXAM KNEE 4 OR MORE: CPT

## 2019-10-27 PROCEDURE — 85027 COMPLETE CBC AUTOMATED: CPT

## 2019-10-27 PROCEDURE — T1013: CPT

## 2019-10-27 PROCEDURE — 73564 X-RAY EXAM KNEE 4 OR MORE: CPT | Mod: 26,LT

## 2019-10-27 PROCEDURE — 80053 COMPREHEN METABOLIC PANEL: CPT

## 2019-10-27 PROCEDURE — 96375 TX/PRO/DX INJ NEW DRUG ADDON: CPT

## 2019-10-27 RX ORDER — METOCLOPRAMIDE HCL 10 MG
10 TABLET ORAL ONCE
Refills: 0 | Status: COMPLETED | OUTPATIENT
Start: 2019-10-27 | End: 2019-10-27

## 2019-10-27 RX ORDER — DIPHENHYDRAMINE HCL 50 MG
25 CAPSULE ORAL ONCE
Refills: 0 | Status: COMPLETED | OUTPATIENT
Start: 2019-10-27 | End: 2019-10-27

## 2019-10-27 RX ADMIN — Medication 25 MILLIGRAM(S): at 18:34

## 2019-10-27 RX ADMIN — Medication 10 MILLIGRAM(S): at 18:34

## 2019-10-27 NOTE — ED PROVIDER NOTE - OBJECTIVE STATEMENT
38yo male pmhx seizure disorder, etoh abuse, TBI presents to ED BIBEMS for headache and visual changes, Ot notes he was walking in the street when he felt like he was seeing multiples of things. Sometime sit would be ten cars instead of one. Sometimes it would be 5 people instead of one. + headache. No n/v/d. No focal weakness or numbness. No new trauma. No reliable way to reproduce symptoms. No loss of vision. No double vision. No syncope. No chest pain, sob. Pt also notes left knee weakness. While walking he has pain and weakness in his knee which often makes its difficult to walk. No new trauma. Pt seen in ED several days ago for etoh intox and knee pain. ED  Raquel used for translation.

## 2019-10-27 NOTE — ED ADULT NURSE NOTE - NSIMPLEMENTINTERV_GEN_ALL_ED
Implemented All Fall Risk Interventions:  [place] to call system. Call bell, personal items and telephone within reach. Instruct patient to call for assistance. Room bathroom lighting operational. Non-slip footwear when patient is off stretcher. Physically safe environment: no spills, clutter or unnecessary equipment. Stretcher in lowest position, wheels locked, appropriate side rails in place. Provide visual cue, wrist band, yellow gown, etc. Monitor gait and stability. Monitor for mental status changes and reorient to person, place, and time. Review medications for side effects contributing to fall risk. Reinforce activity limits and safety measures with patient and family.

## 2019-10-27 NOTE — ED PROVIDER NOTE - NEUROLOGICAL, MLM
Alert and oriented, no focal deficits, no motor or sensory deficits. CN 2-12 intact. No drift. No double vision in all visual fields.

## 2019-10-27 NOTE — ED ADULT NURSE NOTE - OBJECTIVE STATEMENT
pt alert and oriented x4 came in c/o headache with burry vision and diplopia. respirations even unlabored. pt c/o left knee pain from one week ago. pt educated on plan of care, pt able to successfully teach back plan of care to RN, RN will continue to reeducate pt during hospital stay.

## 2019-10-27 NOTE — ED ADULT TRIAGE NOTE - CHIEF COMPLAINT QUOTE
Patient arrived via EMS, awake, alert, and oriented times 3, breathing unlabored.  Patient complaining of left knee pain which occurred 6 days ago while working.  patient also complaining of head pain, and intermittent visual changes which patient states started 30 minutes ago.  Patient has history of trauma to head in 2006 with scar noted to left side of head.  MD called to bedside, priority CT called.   present during assessment.

## 2019-10-27 NOTE — ED PROVIDER NOTE - CLINICAL SUMMARY MEDICAL DECISION MAKING FREE TEXT BOX
pt with headaches, visual changes, knee pain. normal neuro exam. pain with movement in left knee. will obtain labs, etoh level, ct head, xray knee, reassess. migraine meds.

## 2019-10-27 NOTE — ED PROVIDER NOTE - PATIENT PORTAL LINK FT
You can access the FollowMyHealth Patient Portal offered by [facility] by registering at the following website: [email or web address]. By joining [employer]’s FollowMyHealth portal, you will also be able to view your health information using other applications (apps) compatible with our system.

## 2019-10-27 NOTE — ED PROVIDER NOTE - PROGRESS NOTE DETAILS
[place]: Assumed care of patient at signout pending CT head read, and labs. No evidence of traumatic injury on head CT, labs were unremarkable, and headache has resolved with treatment. He is ambulatory with a steady gait and safe for discharge.

## 2019-10-28 NOTE — DISCUSSION/SUMMARY
[FreeTextEntry1] : Called patient to assess the situation. Patient did not describe the event leading to ED visit, only complaining of pain. Diverts conversation. Patient stated the need for Keppra refill, however not listed as current med. Keppra is in allergy list. Addressed the necessity of medical assessment prior to prescribing meds. Provided clinic phone number to make an appointment.\par

## 2019-11-05 ENCOUNTER — APPOINTMENT (OUTPATIENT)
Dept: INTERNAL MEDICINE | Facility: CLINIC | Age: 39
End: 2019-11-05
Payer: OTHER MISCELLANEOUS

## 2019-11-05 VITALS
HEIGHT: 66 IN | WEIGHT: 214 LBS | DIASTOLIC BLOOD PRESSURE: 80 MMHG | SYSTOLIC BLOOD PRESSURE: 120 MMHG | BODY MASS INDEX: 34.39 KG/M2

## 2019-11-05 DIAGNOSIS — R51 HEADACHE: ICD-10-CM

## 2019-11-05 DIAGNOSIS — S06.9X9S UNSPECIFIED INTRACRANIAL INJURY WITH LOSS OF CONSCIOUSNESS OF UNSPECIFIED DURATION, SEQUELA: ICD-10-CM

## 2019-11-05 PROCEDURE — 99213 OFFICE O/P EST LOW 20 MIN: CPT

## 2019-11-10 PROBLEM — R51 HEADACHE: Status: ACTIVE | Noted: 2019-11-10

## 2019-11-10 PROBLEM — S06.9X9S TRAUMATIC BRAIN INJURY WITH LOSS OF CONSCIOUSNESS, SEQUELA: Status: ACTIVE | Noted: 2019-01-23

## 2019-11-10 NOTE — REVIEW OF SYSTEMS
[FreeTextEntry3] : Please see HPI [FreeTextEntry7] : Has had chronic diarrhea improved with Imodium  [FreeTextEntry9] : Please see HPI [de-identified] : Please see HPI

## 2019-11-10 NOTE — ASSESSMENT
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 Accompanied with wife. \par \par # Workman's Compensation:\par - Since date of injury was in 2007, case has likely been settled. Requested that patient go to neurology workman's compensation clinic to evaluate if headaches are sequelae of injury. \par \par # Headache:\par - Recommended tylenol and ibuprofen for now and alcohol cessation. Requested that patient follow-up with neurology clinic regarding headache and history of seizures. \par \par D/w Dr. Sibley

## 2019-11-10 NOTE — HISTORY OF PRESENT ILLNESS
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 Accompanied with wife. \jennifer moore On chart review he presented to neurology as a new patient on May 30th 2019. He reported having a tree trunk falling on his head with three subsequent surgeries. This had been complicated by headache, memory impairment, cognitive impairments (registration). He feels that his hands are both numb, with neck and back pain. The plan from neurology was to establish with a workmans comp doctor and then may consider MR brain and neck. A1c, lipid, multivitamin, riboflavin and mg oxide. He was also seen in the ED on 10/27. It was reported then that he had a history of alcohol use, he had a headache at that time. \jennifer \jennifer Had accident in 2007 (it was on November 10th 2007). He was working with a [employer]. Tree trunk fell on head. Was in hospital for 8 months with coma. Had two operations (three per patient). Periodically had complaints, and lately has been having headaches every day. Had went to emergency room two weeks ago. Been complaining about that since. Went to a health center who referred him to a neurologist. That particular neurologist did not take his compensation, which is how he was referred here. He is constantly having headaches. On paperwork brought in by wife, he also had history of osteomyelitis of his skull in 2012. He was on levetiracetam for many years, but at the time of 45 hour EEG on 3/1/2017 had been off. This EEG did not show epileptiform changes, but did show higher amplitude cerebral activity on L side, likely due to skull defect. It has been three years since last on keppra. Has not had seizures since then.  \jennifer moore Lately started having headaches, been a few weeks or months. Been consistent, sometimes every day. It hurts on the left side of the head. Sometimes radiates down neck and occasional numbness of hands. Feels like it is sleeping. Has difficulty with vision during this. 10/10, feels like burning, sometimes light bothers him but does not need to go to a dark room. Sometimes has nausea. Sometimes headache lasts for minutes, but per wife seemed more constant. Has not taken any medication for headache. Has noticed some problems with knees, sometimes it hurts. Headache pain lasts 5-10 minutes. \par \par Wife reports he has difficulty following simple directions and registration. Unclear if language barrier also a part, but has been worse. \par \par Called in middle of interview: [place] [id number]\par \par Been under stress, confused, has had a lot of pain. Feels confused and stress. Feels confused and feels that he does not have sufficient compensation. He is unable to work. Upset about how much toll the accident had and wants more money. Has reported stress on his life. Feels uncomfortable. Since 2007 has felt that his life had stopped. Before that point he had worked a lot. \par \par Drinks alcohol, when he feels bad 4-5 beers. Does not do that any more. He reported last time drank 2 beers, two weeks ago. \jennifer Does not smoke.

## 2019-11-10 NOTE — PHYSICAL EXAM
[No Acute Distress] : no acute distress [Well Nourished] : well nourished [Well Developed] : well developed [Well-Appearing] : well-appearing [Normal Sclera/Conjunctiva] : normal sclera/conjunctiva [EOMI] : extraocular movements intact [No Respiratory Distress] : no respiratory distress  [No Accessory Muscle Use] : no accessory muscle use [de-identified] : No obvious motor deficits in upper or lower extremities. Sensation grossly intact. Difficulty with coordination (finger-nose finger) R greater than L. Difficulty with registering command for finger nose finger.  [de-identified] : Notable nystagmus and peripheral visual defect in R upper field [de-identified] : Some temporal muscular changes on L head

## 2019-11-26 ENCOUNTER — APPOINTMENT (OUTPATIENT)
Dept: GASTROENTEROLOGY | Facility: HOSPITAL | Age: 39
End: 2019-11-26

## 2019-11-26 ENCOUNTER — OUTPATIENT (OUTPATIENT)
Dept: OUTPATIENT SERVICES | Facility: HOSPITAL | Age: 39
LOS: 1 days | End: 2019-11-26
Payer: SELF-PAY

## 2019-11-26 VITALS
HEIGHT: 66 IN | BODY MASS INDEX: 34.39 KG/M2 | WEIGHT: 214 LBS | SYSTOLIC BLOOD PRESSURE: 122 MMHG | HEART RATE: 67 BPM | RESPIRATION RATE: 14 BRPM | DIASTOLIC BLOOD PRESSURE: 74 MMHG

## 2019-11-26 DIAGNOSIS — R10.9 UNSPECIFIED ABDOMINAL PAIN: ICD-10-CM

## 2019-11-26 PROCEDURE — G0463: CPT

## 2019-11-26 NOTE — ASSESSMENT
[FreeTextEntry1] : IMPRESSION\par -  History of abdominal pain and diarrhea, now resolved with imodium: likely IBS-D. Has had negative stool culture, negative GI PCR and negative celiac panel. Pt does not want EGD/colonsocopy given anxiety over procedures and he states he feels better with imodium\par - Obesity: BMI 34\par \par RECOMMENDATION\par - c/w imodium prn\par - recommend weight loss with diet and exercise \par - f/u as needed basis\par \par

## 2019-11-26 NOTE — PHYSICAL EXAM
[General Appearance - Alert] : alert [General Appearance - In No Acute Distress] : in no acute distress [Outer Ear] : the ears and nose were normal in appearance [Oropharynx] : the oropharynx was normal [Neck Appearance] : the appearance of the neck was normal [Neck Cervical Mass (___cm)] : no neck mass was observed [Jugular Venous Distention Increased] : there was no jugular-venous distention [Thyroid Diffuse Enlargement] : the thyroid was not enlarged [Auscultation Breath Sounds / Voice Sounds] : lungs were clear to auscultation bilaterally [Thyroid Nodule] : there were no palpable thyroid nodules [Heart Rate And Rhythm] : heart rate was normal and rhythm regular [Heart Sounds] : normal S1 and S2 [Heart Sounds Gallop] : no gallops [Murmurs] : no murmurs [Heart Sounds Pericardial Friction Rub] : no pericardial rub [Full Pulse] : the pedal pulses are present [Edema] : there was no peripheral edema [Bowel Sounds] : normal bowel sounds [Abdomen Soft] : soft [Abdomen Tenderness] : non-tender [No CVA Tenderness] : no ~M costovertebral angle tenderness [Abdomen Mass (___ Cm)] : no abdominal mass palpated [No Spinal Tenderness] : no spinal tenderness [Abnormal Walk] : normal gait [Nail Clubbing] : no clubbing  or cyanosis of the fingernails [Musculoskeletal - Swelling] : no joint swelling seen [Motor Tone] : muscle strength and tone were normal [Skin Color & Pigmentation] : normal skin color and pigmentation [] : no rash [Skin Turgor] : normal skin turgor [Oriented To Time, Place, And Person] : oriented to person, place, and time [Impaired Insight] : insight and judgment were intact [Affect] : the affect was normal [FreeTextEntry1] : R sided weakness, large scar on scalp

## 2019-11-26 NOTE — HISTORY OF PRESENT ILLNESS
[___ Month(s) Ago] : [unfilled] month(s) ago [Heartburn] : denies heartburn [Nausea] : denies nausea [Vomiting] : denies vomiting [Diarrhea] : resolved diarrhea [Constipation] : denies constipation [Yellow Skin Or Eyes (Jaundice)] : denies jaundice [Abdominal Pain] : resolved abdominal pain [Abdominal Swelling] : denies abdominal swelling [Rectal Pain] : denies rectal pain [de-identified] : 38 year old male with TBI 2007, acute cholecystitis 2/2 gallstones s/p cholecystectomy (12/2015) presented to GI clinic for follow up for abdominal pain.\par \par 11/26/2019: Pt states he continues to have diarrhea and abdominal pain. Pt states diarrhea has been chronic for 4 years. Pt states he takes Imodium which helps. He takes the Imodium daily which helps the diarrhea, he has 1 soft brown BM with the Imodium. Pt states he also had prior abdominal pain. Imodium has helped abdominal pain as well. Prior to Imodium he would have frequent lower abdominal pain that was often sharp in nature. Denies fever or chills. Denies hematochezia or melena. \par \par 6/18/2019 Visit: Patient was previously seen in 3/2019 for on and off abdominal pain,and diarrhea with >5 BMs a day. He was scheduled for an endoscopy and a colonoscopy. When he went to the endoscopy unit, he was scared and cancelled the procedures. \par Labs reviewed: normal\par Stool culture: negative\par Celiac panel: negative

## 2020-01-31 ENCOUNTER — EMERGENCY (EMERGENCY)
Facility: HOSPITAL | Age: 40
LOS: 1 days | Discharge: HOME | End: 2020-01-31
Attending: EMERGENCY MEDICINE
Payer: SELF-PAY

## 2020-01-31 VITALS
WEIGHT: 250 LBS | HEART RATE: 61 BPM | DIASTOLIC BLOOD PRESSURE: 80 MMHG | TEMPERATURE: 99 F | HEIGHT: 66 IN | SYSTOLIC BLOOD PRESSURE: 112 MMHG

## 2020-01-31 PROCEDURE — 73564 X-RAY EXAM KNEE 4 OR MORE: CPT | Mod: 26,LT

## 2020-01-31 PROCEDURE — 99283 EMERGENCY DEPT VISIT LOW MDM: CPT

## 2020-01-31 PROCEDURE — 73564 X-RAY EXAM KNEE 4 OR MORE: CPT

## 2020-01-31 NOTE — ED STATDOCS - CARE PLAN
Principal Discharge DX:	Chronic pain of left knee  Assessment and plan of treatment:	Tylenol extra strength 2 tablets every 4 hours or Ibuprofen 600mg (3 tablets) every 6 hours as needed for aches, pains. Ace wrap for comfort.  Follow-up with Orthopedic surgery asap: call the office today to arrange follow-up appointment

## 2020-01-31 NOTE — ED STATDOCS - MUSCULOSKELETAL, MLM
no gross deformity of extremity, FROM active and passive, (-) crepitus with ROM, (-) effusion, (-) ballottement,  (-) tenderness patella, (-) tenderness proximal fibula, (-) tenderness femoral condyles, no gross instability noted: (-) drawer/ lachmans, (-) lateral pivot, (-) Shaun.

## 2020-01-31 NOTE — ED ADULT TRIAGE NOTE - CHIEF COMPLAINT QUOTE
Patient presents to ER complaining of left knee pain, patient denies any recent injury, patient states unable to walk due to pain.

## 2020-01-31 NOTE — ED STATDOCS - NSFOLLOWUPCLINICS_GEN_ALL_ED_FT
[facility]  Orthopedics  [address] [place] [id number]  Phone: [phone]  Fax:   Follow Up Time: 7-10 Days

## 2020-01-31 NOTE — ED STATDOCS - PATIENT PORTAL LINK FT
You can access the FollowMyHealth Patient Portal offered by [facility] by registering at the following website: [email or web address]. By joining [employer]’s FollowMyHealth portal, you will also be able to view your health information using other applications (apps) compatible with our system.

## 2020-01-31 NOTE — ED STATDOCS - NSFOLLOWUPINSTRUCTIONS_ED_ALL_ED_FT
Tylenol extra strength 2 tablets every 4 hours or Ibuprofen 600mg (3 tablets) every 6 hours as needed for aches, pains. Ace wrap for comfort.  Follow-up with Orthopedic surgery asap: call the office today to arrange follow-up appointment

## 2020-01-31 NOTE — ED STATDOCS - OBJECTIVE STATEMENT
c/o left knee pain.  reports prior knee pain treated with "cortisone shot"  (but reports no follow-up with ortho specialist.  Experiencing increased knee pain over the past 6 mths. Denies recent trauma or injury.  denies knee swelling.  Denies fever.

## 2020-02-05 NOTE — DISCUSSION/SUMMARY
[FreeTextEntry1] : Chief Complaint: headache.\par \par - Chief Complaint: The patient is a 39y Male complaining of headache.\par - HPI Objective Statement: 38yo male pmhx seizure disorder, etoh abuse, TBI\par presents to ED BIBEMS for headache and visual changes, Ot notes he was walking\par in the street when he felt like he was seeing multiples of things. Sometime sit\par would be ten cars instead of one. Sometimes it would be 5 people instead of\par one. + headache. No n/v/d. No focal weakness or numbness. No new trauma. No\par reliable way to reproduce symptoms. No loss of vision. No double vision. No\par syncope. No chest pain, sob. Pt also notes left knee weakness. While walking he\par has pain and weakness in his knee which often makes its difficult to walk. No\par new trauma. Pt seen in ED several days ago for etoh intox and knee pain. ED\par  Raquel used for translation.\par \par HIV:\par HIV Status:\par - Offered: Declined\par \par PAST MEDICAL/SURGICAL/FAMILY/SOCIAL HISTORY:\par Past Medical History:\par Alcohol abuse\par No pertinent past medical history\par Seizure\par Skull fracture\par Traumatic brain injury.\par \par Past Surgical History:\par No significant past surgical history\par S/P brain surgery left 2007 and 12/2013.\par \par ALLERGIES AND HOME MEDICATIONS:\par Allergies:\par  Allergies:\par 	Keppra: Drug, Hives\par \par Home Medications:\par * Patient Currently Takes Medications as of 21-Aug-2019 18:48 documented in\par Structured Notes\par - Cipro 500 mg oral tablet: 1 tab(s) orally every 12 hours\par -  mg oral tablet: 1 tab(s) orally every 8 hours, with food\par - Robaxin-750 oral tablet: 2 tab(s) orally 3 times a day\par -  mg oral tablet: 1 tab(s) orally every 6 hours\par - Fioricet oral capsule: 1 cap(s) orally every 4 hours, As Needed for moderate\par pain\par - Keppra 500 mg oral tablet: 1 tab(s) orally 2 times a day\par - Pepcid 40 mg oral tablet: 1 tab(s) orally once a day (at bedtime)\par - Bentyl 10 mg oral capsule: 2 cap(s) orally 4 times a day\par - Robaxin-750 oral tablet: 2 tab(s) orally 3 times a day\par - acetaminophen-oxyCODONE 325 mg-5 mg oral tablet: 1 tab(s) orally every 6\par hours MDD:4 tabs\par - predniSONE 20 mg oral tablet: 2 tab(s) orally once a day\par - indomethacin 50 mg oral capsule: 1 cap(s) orally 3 times a day\par - mometasone 0.1% topical cream: Apply topically to affected area 2 times a\par day\par - Pepcid 20 mg oral tablet: 1 tab(s) orally once a day\par - ibuprofen 600 mg oral tablet: 1 tab(s) orally every 6 hours\par - acetaminophen 325 mg oral tablet: 2 tab(s) orally every 6 hours\par - levETIRAcetam 500 mg oral tablet: 1 tab(s) orally 3 times a day\par \par REVIEW OF SYSTEMS:\par Review of Systems:\par - CONSTITUTIONAL: no fever and no chills.\par - MUSCULOSKELETAL: - - -\par - Musculoskeletal [+]: JOINT PAIN\par - NEUROLOGICAL: - - -\par - Neurological [+]: changes in vision\par - ROS STATEMENT: all other ROS negative except as per HPI\par \par PHYSICAL EXAM:\par - CONSTITUTIONAL: Well appearing, well nourished, awake, alert, oriented to\par person, place, time/situation and in no apparent distress.\par - ENMT: Airway patent, Nasal mucosa clear. Mouth with normal mucosa. Throat has\par no vesicles, no oropharyngeal exudates and uvula is midline.\par - EYES: Clear bilaterally, pupils equal, round and reactive to light.\par - CARDIAC: Normal rate, regular rhythm. Heart sounds S1, S2. No murmurs, rubs\par or gallops.\par - RESPIRATORY: Breath sounds clear and equal bilaterally.\par - GASTROINTESTINAL: Abdomen soft, non-tender, no guarding.\par - MUSCULOSKELETAL: Spine appears normal, range of motion is not limited, no\par muscle or joint tenderness\par - NEUROLOGICAL: Alert and oriented, no focal deficits, no motor or sensory\par deficits. CN 2-12 intact. No drift. No double vision in all visual fields.\par - SKIN: Skin normal color for race, warm, dry and intact. No evidence of rash.\par - PSYCHIATRIC: Alert and oriented to person, place, time/situation. normal mood\par and affect. no apparent risk to self or others.\par - HEME LYMPH: No adenopathy or splenomegaly. No cervical or inguinal\par lymphadenopathy.\par \par CURRENT ORDERS/:\par - diphenhydrAMINE Injectable, [Ordered as BENADRYL Injectable]\par 	25 milliGRAM(s), IV Push, once, Stop After 1 Doses\par 	Administration Instructions: Max IV Push dose 50 milliGRAM(s)\par 	IF IV PUSH, ADMINISTER 25 mG PER MIN\par 	This is a Look-alike/Sound-alike Medication\par 	Provider's Contact #: [phone], 27-Oct-2019, Active, Standard\par - metoclopramide Injectable, [Ordered as REGLAN Injectable]\par 	10 milliGRAM(s), IV Push, once, Stop After 1 Doses\par 	Administration Instructions: Max IV Push dose 10 milliGRAM(s)\par 	IF IV PUSH, ADMINISTER OVER 2 MIN\par 	Provider's Contact #: [phone], 27-Oct-2019, Active, Standard\par \par PROGRESS NOTE:\par Date: 27-Oct-2019 20:02.\par \par Progress: [place]: Assumed care of patient at signout pending CT head read, and\par labs. No evidence of traumatic injury on head CT, labs were unremarkable, and\par headache has resolved with treatment. He is ambulatory with a steady gait and\par safe for discharge.\par \par DISPOSITION:\par Care Plan - Instructions:\par Principal Discharge DX: Acute nonintractable headache, unspecified headache\par type.\par \par Impression:\par Principal Discharge Dx Acute nonintractable headache, unspecified headache\par type.\par \par Medical Decision Making:\par - Physician E/M Selection [id number] Detailed\par - The following orders were submitted: Labs, Imaging Studies, Medications\par - Clinical Summary (MDM): Summarize the clinical encounter pt with headaches,\par visual changes, knee pain. normal neuro exam. pain with movement in left knee.\par will obtain labs, etoh level, ct head, xray knee, reassess. migraine meds.\par - Follow-up Instructions (will be supplied to the patient only if discharged) \par Follow up with your primary care doctor. Return to the ER for any new or\par worsening symptoms.\par \par Disposition:\par Disposition: DISCHARGE.\par \par Patient requests all Lab and Radiology Results on their Discharge Instructions.\par \par \par Discharge Disposition: Home.\par \par Discharge Date: 27-Oct-2019.\par \par Condition at Discharge: Improved.

## 2020-03-01 ENCOUNTER — EMERGENCY (EMERGENCY)
Facility: HOSPITAL | Age: 40
LOS: 1 days | Discharge: HOME | End: 2020-03-01
Attending: EMERGENCY MEDICINE
Payer: SELF-PAY

## 2020-03-01 VITALS
OXYGEN SATURATION: 92 % | DIASTOLIC BLOOD PRESSURE: 76 MMHG | TEMPERATURE: 98 F | HEIGHT: 70 IN | RESPIRATION RATE: 20 BRPM | HEART RATE: 67 BPM | SYSTOLIC BLOOD PRESSURE: 121 MMHG | WEIGHT: 199.96 LBS

## 2020-03-01 LAB
ALBUMIN SERPL ELPH-MCNC: 4.3 G/DL — SIGNIFICANT CHANGE UP (ref 3.3–5.2)
ALP SERPL-CCNC: 45 U/L — SIGNIFICANT CHANGE UP (ref 40–120)
ALT FLD-CCNC: 28 U/L — SIGNIFICANT CHANGE UP
ANION GAP SERPL CALC-SCNC: 11 MMOL/L — SIGNIFICANT CHANGE UP (ref 5–17)
APAP SERPL-MCNC: <7.5 UG/ML — LOW (ref 10–26)
AST SERPL-CCNC: 31 U/L — SIGNIFICANT CHANGE UP
BASOPHILS # BLD AUTO: 0.03 K/UL — SIGNIFICANT CHANGE UP (ref 0–0.2)
BASOPHILS NFR BLD AUTO: 0.6 % — SIGNIFICANT CHANGE UP (ref 0–2)
BILIRUB SERPL-MCNC: 1 MG/DL — SIGNIFICANT CHANGE UP (ref 0.4–2)
BUN SERPL-MCNC: 9 MG/DL — SIGNIFICANT CHANGE UP (ref 8–20)
CALCIUM SERPL-MCNC: 9.4 MG/DL — SIGNIFICANT CHANGE UP (ref 8.6–10.2)
CHLORIDE SERPL-SCNC: 100 MMOL/L — SIGNIFICANT CHANGE UP (ref 98–107)
CK MB CFR SERPL CALC: 4.5 NG/ML — SIGNIFICANT CHANGE UP (ref 0–6.7)
CK SERPL-CCNC: 359 U/L — HIGH (ref 30–200)
CO2 SERPL-SCNC: 26 MMOL/L — SIGNIFICANT CHANGE UP (ref 22–29)
CREAT SERPL-MCNC: 0.81 MG/DL — SIGNIFICANT CHANGE UP (ref 0.5–1.3)
EOSINOPHIL # BLD AUTO: 0.48 K/UL — SIGNIFICANT CHANGE UP (ref 0–0.5)
EOSINOPHIL NFR BLD AUTO: 8.8 % — HIGH (ref 0–6)
GLUCOSE SERPL-MCNC: 103 MG/DL — HIGH (ref 70–99)
HCT VFR BLD CALC: 41.6 % — SIGNIFICANT CHANGE UP (ref 39–50)
HGB BLD-MCNC: 13.7 G/DL — SIGNIFICANT CHANGE UP (ref 13–17)
IMM GRANULOCYTES NFR BLD AUTO: 0.2 % — SIGNIFICANT CHANGE UP (ref 0–1.5)
LIDOCAIN IGE QN: 31 U/L — SIGNIFICANT CHANGE UP (ref 22–51)
LYMPHOCYTES # BLD AUTO: 1.99 K/UL — SIGNIFICANT CHANGE UP (ref 1–3.3)
LYMPHOCYTES # BLD AUTO: 36.6 % — SIGNIFICANT CHANGE UP (ref 13–44)
MCHC RBC-ENTMCNC: 28.4 PG — SIGNIFICANT CHANGE UP (ref 27–34)
MCHC RBC-ENTMCNC: 32.9 GM/DL — SIGNIFICANT CHANGE UP (ref 32–36)
MCV RBC AUTO: 86.3 FL — SIGNIFICANT CHANGE UP (ref 80–100)
MONOCYTES # BLD AUTO: 0.49 K/UL — SIGNIFICANT CHANGE UP (ref 0–0.9)
MONOCYTES NFR BLD AUTO: 9 % — SIGNIFICANT CHANGE UP (ref 2–14)
NEUTROPHILS # BLD AUTO: 2.43 K/UL — SIGNIFICANT CHANGE UP (ref 1.8–7.4)
NEUTROPHILS NFR BLD AUTO: 44.8 % — SIGNIFICANT CHANGE UP (ref 43–77)
PLATELET # BLD AUTO: 202 K/UL — SIGNIFICANT CHANGE UP (ref 150–400)
POTASSIUM SERPL-MCNC: 4.2 MMOL/L — SIGNIFICANT CHANGE UP (ref 3.5–5.3)
POTASSIUM SERPL-SCNC: 4.2 MMOL/L — SIGNIFICANT CHANGE UP (ref 3.5–5.3)
PROT SERPL-MCNC: 7.2 G/DL — SIGNIFICANT CHANGE UP (ref 6.6–8.7)
RBC # BLD: 4.82 M/UL — SIGNIFICANT CHANGE UP (ref 4.2–5.8)
RBC # FLD: 12 % — SIGNIFICANT CHANGE UP (ref 10.3–14.5)
SALICYLATES SERPL-MCNC: <0.6 MG/DL — LOW (ref 10–20)
SODIUM SERPL-SCNC: 137 MMOL/L — SIGNIFICANT CHANGE UP (ref 135–145)
TSH SERPL-MCNC: 1.48 UIU/ML — SIGNIFICANT CHANGE UP (ref 0.27–4.2)
WBC # BLD: 5.43 K/UL — SIGNIFICANT CHANGE UP (ref 3.8–10.5)
WBC # FLD AUTO: 5.43 K/UL — SIGNIFICANT CHANGE UP (ref 3.8–10.5)

## 2020-03-01 PROCEDURE — 99284 EMERGENCY DEPT VISIT MOD MDM: CPT | Mod: 25

## 2020-03-01 PROCEDURE — 80053 COMPREHEN METABOLIC PANEL: CPT

## 2020-03-01 PROCEDURE — 36415 COLL VENOUS BLD VENIPUNCTURE: CPT

## 2020-03-01 PROCEDURE — 70450 CT HEAD/BRAIN W/O DYE: CPT

## 2020-03-01 PROCEDURE — 80307 DRUG TEST PRSMV CHEM ANLYZR: CPT

## 2020-03-01 PROCEDURE — 82962 GLUCOSE BLOOD TEST: CPT

## 2020-03-01 PROCEDURE — 84443 ASSAY THYROID STIM HORMONE: CPT

## 2020-03-01 PROCEDURE — 70450 CT HEAD/BRAIN W/O DYE: CPT | Mod: 26

## 2020-03-01 PROCEDURE — 82553 CREATINE MB FRACTION: CPT

## 2020-03-01 PROCEDURE — 83690 ASSAY OF LIPASE: CPT

## 2020-03-01 PROCEDURE — 85027 COMPLETE CBC AUTOMATED: CPT

## 2020-03-01 PROCEDURE — 93010 ELECTROCARDIOGRAM REPORT: CPT

## 2020-03-01 PROCEDURE — 82550 ASSAY OF CK (CPK): CPT

## 2020-03-01 PROCEDURE — 93005 ELECTROCARDIOGRAM TRACING: CPT

## 2020-03-01 PROCEDURE — 99285 EMERGENCY DEPT VISIT HI MDM: CPT

## 2020-03-01 RX ORDER — MECLIZINE HCL 12.5 MG
25 TABLET ORAL ONCE
Refills: 0 | Status: COMPLETED | OUTPATIENT
Start: 2020-03-01 | End: 2020-03-01

## 2020-03-01 RX ORDER — DIPHENHYDRAMINE HCL 50 MG
50 CAPSULE ORAL ONCE
Refills: 0 | Status: DISCONTINUED | OUTPATIENT
Start: 2020-03-01 | End: 2020-03-07

## 2020-03-01 RX ORDER — SODIUM CHLORIDE 9 MG/ML
1000 INJECTION, SOLUTION INTRAVENOUS ONCE
Refills: 0 | Status: DISCONTINUED | OUTPATIENT
Start: 2020-03-01 | End: 2020-03-07

## 2020-03-01 RX ADMIN — Medication 25 MILLIGRAM(S): at 21:47

## 2020-03-01 NOTE — ED PROVIDER NOTE - PATIENT PORTAL LINK FT
You can access the FollowMyHealth Patient Portal offered by [facility] by registering at the following website: [email or web address]. By joining [employer]’s FollowMyHealth portal, you will also be able to view your health information using other applications (apps) compatible with our system.

## 2020-03-01 NOTE — ED PROVIDER NOTE - OBJECTIVE STATEMENT
38 y/o M pt with hx of TBI, EtOH abuse, and seizures presents to ED c/o dizziness and generalized weakness that began this afternoon. Pt describes dizziness as a spinning sensation. He took Advil with no relief. denies fever. denies HA or neck pain. no chest pain or sob. no abd pain. no n/v/d. no urinary f/u/d. no back pain. no motor or sensory deficits. denies illicit drug use. no recent travel. no rash. no other acute issues symptoms or concerns.

## 2020-03-01 NOTE — ED PROVIDER NOTE - CLINICAL SUMMARY MEDICAL DECISION MAKING FREE TEXT BOX
pt now ambulating in nad no sings post cva isolated dizziness no diplopia no dysphagia no slurrd pseech return to ed for intractable HA, persistent vomiting, or new onset motor/sensory deficits

## 2020-03-01 NOTE — ED ADULT TRIAGE NOTE - CHIEF COMPLAINT QUOTE
pt a+ox3, reports 3days of weakness. denies any recent fever, chills, night sweats. pt denies chest pain or SOB.  in triage.

## 2020-03-01 NOTE — ED PROVIDER NOTE - NEUROLOGICAL, MLM
neuro: CN II - XII intact, EOMI, PERRL, no papilledema, 5/5 muscle strength x 4 extremities, no sensory deficits, 2+ dtr globally, negative babinski, no ataxic gait, normal BRITTANY and FNT, normal romberg

## 2020-03-01 NOTE — ED PROVIDER NOTE - AGGRAVATING FACTORS
Mildly to Moderately Impaired: difficulty hearing in some environments or speaker may need to increase volume or speak distinctly none

## 2020-03-18 DIAGNOSIS — M25.569 PAIN IN UNSPECIFIED KNEE: ICD-10-CM

## 2020-05-06 ENCOUNTER — APPOINTMENT (OUTPATIENT)
Dept: ORTHOPEDIC SURGERY | Facility: HOSPITAL | Age: 40
End: 2020-05-06

## 2020-07-25 NOTE — ED ADULT TRIAGE NOTE - INTERNATIONAL TRAVEL
Patient has known history of fibromyalgia and chronic pain syndrome  Continue home regimen of Topamax, Neurontin  No

## 2021-02-10 NOTE — ED STATDOCS - ENMT, MLM
+Cyclogyl OU. Nasal mucosa clear.  Mouth with normal mucosa  Throat has no vesicles, no oropharyngeal exudates and uvula is midline.

## 2021-07-12 NOTE — ED ADULT TRIAGE NOTE - BMI (KG/M2)
RITA/JOSE Discharge Plan  Informed patient is ready for discharge. Patient’s discharge destination is [facility]. Patient to be picked up by [facility] for today at 18:00-arranged by RITA. Patient has been counseled for post hospitalization care.  Patient agrees and understands goals and plan. Initial implementation of the patient’s discharge plan has been arranged, including any devices/equipment needed for discharge. Discharge plan communicated to MD, RN, JOSE and RITA.    After Visit Summary - Transition Report Information  Family Member Name/Contact Number: Von ([phone])  Family Member Relationship:  (Spouse)  Receiving Agency/Facility: [facility]  Receiving Agency/Facility phone number: [phone]  Receiving Agency/Facility address: [address] [place] [address]  Receiving Agency/Facility city/state: [place], [place]  Receiving Agency/Facility Type: Skilled Nursing Facility  Receiving Agency/Facility Comment: Dr Craig [phone] to follow at SNF     Readmission risk score=N/A due to OBS status   29.9

## 2021-09-16 NOTE — ED ADULT NURSE NOTE - RESPIRATORY ASSESSMENT
PODIATRY BRIEF OP NOTE    PATIENT NAME: Vasquez Morales  YOB: 1957  -  71 y.o. male  MRN: [id number]  DATE: 9/16/2021  BILLING #: [id number]    Surgeon(s):  Chiara Reyna DPM     ASSISTANTS: Tatiana Barrientos DPM PGY1    PRE-OP DIAGNOSIS:   1. Nonhealing wound down to level of subcutaneous tissue, right foot  2. Abscess, right foot  3. Charcot arthropathy, right foot  4. Type 2 diabetes mellitus with peripheral neuropathy    POST-OP DIAGNOSIS: Same as above. PROCEDURE:   1. Incision and drainage of nonviable tissue, right foot    ANESTHESIA: MAC with local block of 9 cc of 1:1 mixture 1% lidocaine plain and 0.5% Marcaine plain to right foot    HEMOSTASIS: Direct pressure    ESTIMATED BLOOD LOSS: Less than 100cc. MATERIALS: 2-0 Nylon, 2-0 Prolene  * No implants in log *    INJECTABLES: 9 cc of 1:1 mixture 1% lidocaine plain and 0.5% Marcaine plain to right foot    SPECIMEN:   ID Type Source Tests Collected by Time Destination   1 : RIGHT FOOT ABSCESS Tissue Foot GRAM STAIN, CULTURE, ANAEROBIC AND AEROBIC [facility] [place] [address] 9/16/2021 [id number]        COMPLICATIONS: None    FINDINGS: Fibrotic, spongiform soft tissue located to plantar right foot wound, secondary to Charcot arthropathy. Purulent drainage noted to plantar midfoot, right. Finds consistent with pre-operative diagnoses. See full op report for details. The patient was counseled at length about the risks of sheela Covid-19 during their perioperative period and any recovery window from their procedure. The patient was made aware that sheela Covid-19  may worsen their prognosis for recovering from their procedure  and lend to a higher morbidity and/or mortality risk. All material risks, benefits, and reasonable alternatives including postponing the procedure were discussed. The patient does wish to proceed with the procedure at this time.     Tatiana Barrientos DPM   Podiatric Medicine & Surgery   9/16/2021 at 5:43 PM
WDL

## 2021-10-28 NOTE — ED ADULT TRIAGE NOTE - IDEAL BODY WEIGHT(KG)
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    Based on her last evaluation from June 2021, she did not have am active/current diagnosis of seizure disorder.    Should you have questions, please let me know.       Sincerely,      Donnell Scott MD  , Pediatric Neurology  [facility] / [place] [id number]  O: [phone]  F: [phone]  
68

## 2022-01-13 NOTE — ED PROVIDER NOTE - ENDOCRINE NEGATIVE STATEMENT, MLM
"CHEMICAL DEPENDENCY DISCHARGE SUMMARY     PATIENT NAME: Winifred Bashir  : 1971    EVALUATION COUNSELOR: STEVEN Strong  TREATMENT COUNSELORS: RA Martinez, [facility]; RA Vann, [facility]  REFERRAL SOURCE:  Self  PROGRAM: [employer].   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    LAST USE DATE: 21  DAYS OF TREATMENT COMPLETED: 28 days     PRESENTING INFORMATION: Per ED note from 21: \"Winifred Bashir is a 50 year old female with a history of polysubstance abuse, bipolar disorder, PTSD, and KELLEN who presents to the Emergency Department with depression. Patient reports a number of things have been piling up over time. She states things began with Markus Domingo and COVID and have been piling up since. She states during COVID she was not working and was home with her children and this was overwhelming. Patient reports she got a divorce, sold her home, moved into a new home, and started a job around the same time and could not handle this. She states moving was hard and being alone with her 2 children was difficult. She states she did not like her new job either. 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 Patient reports her house is currently a mess as she has no energy to take care of things. Patient reports she had a relapse as well and has been drinking alcohol and doing drugs. She " "reports using crack cocaine and \"large blunts.\" She is concerned the drugs have been laced as she has had lower abdominal soreness and heaviness as well as difficulty urinating and bruising. Patient reports drinking 1-1.75L alcohol daily. She last used both drugs and alcohol today. Patient reports she has not had alcohol withdrawal in the past but states she has never drank this much before. She denies history of seizures or DTs. She notes she gets anxious. Patient reports intermittent suicidal thoughts, states \"today not so much.\" No HI. Patient is not currently receiving any mental health services, states she was receiving therapy but not stopped this. Patient is vaccinated against COVID.\"     READMITTANCE INFORMATION: If additional substance use treatment is required, patient may re-admit into the [facility] program following 30 days from date of discharge.    SERVICES PROVIDED:  Services included assessment, treatment planning, and education regarding chemical dependency, mental illness, relationships, and relapse prevention.  The patient also participated in individual therapy, group therapy, recovery oriented workshops, spiritual care counseling, recovery skills training, and aftercare planning.     ISSUES ADDRESSED IN TREATMENT:   DIMENSION 1:  ACUTE INTOXICATION AND WITHDRAWAL   ADMISSION RISK RATIN  DISCHARGE RISK RATIN  Patient reports last use date was 21. Patient completed medical detoxification while admitted to unit 3A. Patient was able to tolerate mild withdrawal symptoms throughout treatment and participate in programming. No concerns at time of discharge.     DIMENSION 2:  BIOMEDICAL    ADMISSION RISK RATIN  DISCHARGE RISK RATIN  Patient reported a medical history of hypertension, intermittent suprapubic pressure and urinary hesitancy, and fibromyalgia. Patient has a primary care provider at [facility] and was encouraged to schedule a post-discharge " "follow-up appointment. Patient maintained medication compliance and appears able to access medical aid independently.      DIMENSION 3:  EMOTIONAL/BEHAVIORAL  ADMISSION RISK RATIN  DISCHARGE RISK RATIN  Patient reports mental health diagnoses of MDD and KELLEN. Upon admission, patient's PHQ-9 initial score was 23/27, indicating severe depression.  Patient's KELLEN-7 initial score was 13/21, indicating moderate anxiety. To help stabilize and maintain her mental and emotional health, patient completed in weekly individual therapy sessions with counselor. She was encouraged to continue mental health counseling in the community after discharge.  Patient participated in a suicide risk screening as part of the CD assessment and was given a rating of Low Risk. Patient completed a safety plan upon entering the [facility]. Upon discharge, patient denied any suicidal thoughts or ideations. Patient reports ongoing challenges with self-esteem and poor self-image related to her addiction and past trauma. Patient completed the \"Book of Me\", \"Acknowledging Grief\", and \"Depression and Recovery\" assignments to help her work through and verbalize difficult feelings. Patient's risk rating in this dimension remains a \"2\", as patient would benefit from continued emotional/mental health supports.    DIMENSION 4:  READINESS FOR CHANGE  ADMISSION RISK RATIN  DISCHARGE RISK RATIN  Patient verbalizes internal motivation for change, with external reinforcement from her licensing board. While at , patient continued to increase her internal motivation for sobriety. Patient completed a collage that depicts what her life would look like in 5 years if she continued using vs. If she remained sober. She also completed the \"First Step\" assignment. Patient [facility] and complied with recommendations provided. Patient has a history of extended periods of sobriety, and was able to verbalize " "the benefits of recovery.    DIMENSION 5:  RELAPSE AND CONTINUED USE POTENTIAL   ADMISSION RISK RATIN  DISCHARGE RISK RATING: 3  Patient exhibits insight into her personal relapse process, but struggles to utilize long-term sober coping skills when confronted with stressors. Patient completed a packet \"Identifying Triggers and Cues\", as well as an \"Attitude Inventory\" to begin to identify potential stressors and coping skills. Patient participated in two weekend workshops on Relapse Prevention and gained insight into the emotional, mental, and physical cues that precede relapse. Patient reported struggling with guilt and shame related to her past using behaviors and resentments toward herself. Patient completed a \"Self-Forgiveness\" assignment, and worked to reconnect with her daughters and acknowledge her strengths as a mother. Patient reports struggling to cope with stressors and experiences cross-addiction. Patient completed a \"Cross Addiction\" packet, as well as \"Coping with Stress\" and a \"Self-Care Assessment\". Patient participated in weekly spirituality groups facilitated by Vicki Arenas and supervised by licensed counseling staff. Patient reported losing connection to her sober community after her relapse, and was encouraged to connect with treatment peers, as well as sober supports from previous periods of recovery. Patient is well connected to the recovery community, and verbalized her plan for continuing to attend sober support meetings outside of [facility]. Patient presented a well-developed relapse prevention plan and verbalized coping strategies she can use when feeling overwhelmed. Her risk rating in this dimension has been lowered from a \"4\" to a \"3\". She was encouraged to continue with aftercare to continue building relapse prevention skills.    DIMENSION 6:  RECOVERY ENVIRONMENT  ADMISSION RISK RATING: 3  DISCHARGE RISK RATIN  Patient reports challenges with interpersonal relationships. " "She completed the \"Feelings and Defenses\" assignment to explore problems with existing relationships that may hinder her recovery efforts. Patient also participated in two weekend workshops on relationship building and gained insight into healthy versus unhealthy relationships. Patient reported having excessive free time and not engaging in meaningful hobbies or activities. Patient completed the \"Overcoming Avoidance\" worksheet and explored sober leisure activities to incorporate in her lifestyle. Patient identified supportive resources in the community that include weekly 12-step meetings, outpatient programming, and individual therapist referrals.      STRENGTHS: Patient is a strong natural leader, who uses her passion to support others and help them in any way she can. Patient also exhibits a willingness to be vulnerable and face any issues, no matter how uncomfortable it may be.     PROGNOSIS:  Prognosis is favorable if patient follows all aftercare recommendations and referrals.      LIVING ARRANGEMENTS AT DISCHARGE: Patient is returning to her own home.      CONTINUING CARE RECOMMENDATIONS AND REFERRALS:   1.  Abstain from all mood-altering chemicals.   2.  Attend a minimum of three 12-step meetings per week.   3.  Obtain a sponsor and maintain regular contact with her.   4.  Complete aftercare at [place] and follow all recommendations.  5.  Monitor and comply with advice of doctors regarding physical health issues and take all medications as prescribed.   6.  Engage in individual therapy to continue addressing mental health concerns.  7.  Continue to invest in building a sober support network.   8.  Continue to monitor and gain understanding of relapse triggers and stressors through the use of development of healthy coping skills.        This information has been disclosed to you from records protected by Federal confidentiality rules (42 CFR part 2). The Federal rules prohibit you from making any further " disclosure of this information unless further disclosure is expressly permitted by the written consent of the person to whom it pertains or as otherwise permitted by 42 CFR part 2. A general authorization for the release of medical or other information is NOT sufficient for this purpose. The Federal rules restrict any use of the information to criminally investigate or prosecute any alcohol or drug abuse patient.        no diabetes and no thyroid trouble.

## 2022-05-12 NOTE — ED ADULT TRIAGE NOTE - MEANS OF ARRIVAL
Please call the office when you leave to schedule an appointment to be seen in 2-3 weeks    Activity:    Do not lift more than 25 lb for 4 weeks post-operatively                 Walking is encouraged  Normal daily activities including climbing steps are okay  Do not engage in strenuous activity or contact sports for 4-6 weeks post-operatively    Return to work:   Return to work to be discussed at first post-operative visit    Diet:   You may return to your normal heart healthy diet    Wound Care: Your wound is closed with skin glue  It is okay to shower  Wash incision gently with soap and water and pat dry  Do not soak incisions in bath water or swim for two weeks  Do not apply any creams or ointments  Apply ice as needed to wound  Wear abdominal binder as much as possible for support  She may certainly removed to wash and shower    Pain Medication:   Please take as directed  No driving while taking narcotic pain medications    Other:  If you have questions after discharge please call the office      If you have increased pain, fever >101 5, increased drainage, redness or a bad smell at your surgery site, please call us immediately or come directly to the Emergency Room ambulatory

## 2023-01-30 NOTE — ED PROVIDER NOTE - CHIEF COMPLAINT
Outpatient Cardiac Rehab is available at [facility] ([place]), if you have not heard from them within 48 hours of discharge, please contact them directly to schedule at: [phone].           AFTER YOU GO HOME FROM YOUR HEART SURGERY  (Coronary artery bypass X2 and mitral valve repair 1/18/2023 with Dr. Kim; Emergent take back for chest exploration for bleed on 1/19/2023 with Dr. Kim)    You had a sternotomy, avoid lifting anything greater than ten pounds for 6 weeks after surgery and then less than 20 pounds for an additional 6 weeks. Do not reach backwards or use arms to push out of chair. Do not let people pull on your arms to assist with standing. Avoid twisting or reaching too far across your body.  Avoid strenuous activities such as bowling, vacuuming, raking, shoveling, golf or tennis for 12 weeks after your surgery. It is okay to resume sex if you feel comfortable in doing so. You may have to try different positions with your partner.  Splint your chest incision by hugging a pillow or bringing your arms across your chest when coughing or sneezing. Please try to sleep on your back for the first 4-6 weeks to avoid extra stress on your sternum (breastbone) while it is healing.     No driving for 4 weeks after surgery or while on pain medication.       Shower or wash your incisions twice daily with soap and water (or as instructed), pat dry. Keep wound clean and dry, showers are okay after discharge, but don't let spray hit directly on incision. No baths or swimming for 1 month. Cover chest tube sites with gauze until they stop draining, then leave open to air. It is not abnormal for chest tube sites to drain yellowish/clear fluid for up to 2-3 weeks after surgery.   Watch for signs of infection: increased redness, tenderness, warmth or any drainage from sternum incision.  Also a temperature > 100.5 F or chills. Call your surgeon or primary care provider's office immediately. Remove any skin glue  The patient is a 37y Male complaining of seizures. left on incisions after 10-14 days. This will not affect your incision and can speed up healing.    Exercise is very important in your recovery. Please follow the guidelines set up for you in your cardiac rehab classes at the hospital. If outpatient cardiac rehab was ordered for you, we highly recommend you participate. If you have problems arranging your cardiac rehab, please call [phone] for all locations, with the exception of [place], please call [phone] and [place], please call [phone].    Avoid sitting for prolonged periods of time, try to walk every hour during the day. If you have a leg incision, elevate your leg often when you are not walking.    Check your weight when you get home from the hospital and continue to check it daily through your recovery for at least a month. If you notice a weight gain of 2-3 pounds in a week, notify your primary care physician, cardiologist or surgeon.    Bowel activity may be slow after surgery. If necessary, you may take an over the counter laxative such as Milk of Magnesia or Miralax. You may have stool softeners prescribed (docusate sodium, Senokot). We recommend using stool softeners while using narcotics for pain (oxycodone/percocet, hydrocodone/vicodin, hydromorphone/dilaudid).      Wean OFF of narcotics (oxycodone, dilaudid, hydrocodone) as soon as possible. You should continue taking acetaminophen as long as you have any surgical pain as the first choice for pain control and add narcotics as necessary for pain to be tolerable.      DENTAL VISITS AFTER SURGERY  If you have had your heart valve repaired or replaced, we do not recommend having any dental work done for 6 months and you will need to take an antibiotic prior to dental visits from now on.  Please notify your dentist before any procedure for the proper treatment needed. The antibiotic is taken by mouth one hour prior to visit. This includes routine cleanings.    DO NOT SMOKE.  IF YOU NEED  HELP QUITTING, PLEASE TALK WITH YOUR CARDIOLOGIST OR PRIMARY DOCTOR.    REGARDING PRESCRIPTION REFILLS.  If you need a refill on your pain medication contact us to discuss your pain and a possible one time refill.   All other medications will be adjusted, discontinued and re-filled by your primary care physician and/or your cardiologist as they were prior to your surgery. We have given you enough for one to three month with possibly one refill.    POST-OPERATIVE CLINIC VISITS  You have a follow up visit with CV[facility] Surgery Advance Care Practitioners in two weeks at the [facility].  Office will contact you. You will then return to the care of your primary provider and your cardiologist. Future medication refills should come from your PCP or Cardiologist.   You should see your primary care provider in 2-4 weeks after discharge.   It is important to see your cardiologist about 4-6 weeks after discharge.    If you do not hear from a  in 7 days, please call [phone] (choose option 1) and request to be seen with a general cardiologist or someone that you have seen in the past.   If there is a need to return to see CT Surgery, please call our  Krys Morales at [phone].     SURGICAL QUESTIONS  Please call Chino Jerez, Mary Deutsch, Violeta Luis, or Emilee Doll with surgical recovery and medication questions; phone numbers are listed below.  They will assist you with your needs and contact other surgery care team members as indicated.    On weekends or after hours, please call [phone] and ask the  to page the Cardiothoracic Surgery fellow on call.      Thank you,    Your Cardiothoracic Surgery Team   Chino Jerez RN Care Coordinator - [phone]  Mary Deutsch, RN Care Coordinator - [phone]   Emilee Graham, RN Care Coordinator - [phone]   Mayuri Luis, RN Care Coordinator - [phone]

## 2023-03-28 NOTE — ED ADULT TRIAGE NOTE - CADM TRG TX PRIOR TO ARRIVAL
IV Tazorac Pregnancy And Lactation Text: This medication is not safe during pregnancy. It is unknown if this medication is excreted in breast milk.

## 2023-08-21 NOTE — ED STATDOCS - NS ED PATIENT SAFETY CONCERN
Reassurance, normal examination  Keep well hydrated  Monitor I&O as discussed  All questions addressed as well as signs and symptoms that warrant a further immediate evaluation.    No

## 2023-10-04 NOTE — ED PROVIDER NOTE - EYES NEGATIVE STATEMENT, MLM
Detail Level: Detailed Quality 226: Preventive Care And Screening: Tobacco Use: Screening And Cessation Intervention: Patient screened for tobacco use and is an ex/non-smoker Quality 431: Preventive Care And Screening: Unhealthy Alcohol Use - Screening: Patient not identified as an unhealthy alcohol user when screened for unhealthy alcohol use using a systematic screening method no discharge, no irritation, no pain, no redness, and no visual changes.

## 2024-07-11 NOTE — ED ADULT NURSE NOTE - OBJECTIVE STATEMENT
pt came to the ED for c/o diffuse abd pain for one week.  denies n/v but stated that had some diarrhea yesterday.  pt is A/Ox3.
No

## 2024-11-26 NOTE — ED PROVIDER NOTE - PROGRESS NOTE DETAILS
Contacted patient informed cervical injections have been denied by insurance. Awaiting for denial letter. Message has been sent to  on next steps.    Patient was informed the office will contact her next week once we hear back from provider.    Pt understood and had no further questions.    s/o other here says friend seems at baseline always has right sided facial droop says that he had been c/o left sided arm numbness which she thinks is related to him sleeping on the left sided head - she does have neuro to f/u with and does want to go home

## 2024-12-04 NOTE — ED ADULT NURSE NOTE - NS ED NURSE RECORD ANOTHER VITAL SIGN
Daily Note     Today's date: 2024  Patient name: Juan Antonio Guy  : 1964  MRN: [id number]  Referring provider: Mo Ceja MD  Dx:   Encounter Diagnosis     ICD-10-CM    1. Acute pain of right shoulder  M25.511               VISIT:  1/10       Subjective: Pt admits to persisting R shd pain...admits to sl increased soreness post P.T. which dissipate and returns to baseline level of 7/10 on a 0-10 pain scale.      Objective: See treatment diary below      Assessment: Tolerated treatment well. Patient demonstrated fatigue post treatment      Plan: Continue per plan of care.      Precautions:       Manuals 10/30/24 11/6/24 11/13/24 11/27/24 12/4                                             Neuro Re-Ed             Table slides: flex, scaption  20x  20x  D/C  20x   20x ea   Scapular retractions  20x with ball 20x with ball   D/C  D/C D/C   Shoulder shrugs  20x 3#  20x 3#  20x 4# 20x 4#  20x 4#    Front raises  20x 3#  20x 3#  20x 4# 20x 4# 20x 4#    Lateral raises 20x 3#   20x 3# 20x 4#  20x 4#  20x 4#    Shoulder press  20x 3#   20x 3#   20x 4#   20x 4#  20x 4#    Seated rows       20x 4#   20x 4#   20x 4#   Ther Ex             UBE(improve R shoulder ROM)  L1 10' L1 10' L1 10'  L1 10' L1 10'   Seated Shoulder flexion/abduction with cane NT 20x ea 20x ea  20x ea  20x ea    Bicep curls 20x 3#   20x 3#  20x 4#  20x 4#  20x 4#    MTP/LTP seated       20x BlueTB   NT                                               Ther Activity                                         Gait Training                                         Modalities             CP PRN  MHP 10' R   MHP 10' R   MHP R 10'   MHP R 15'  MHP R 10'                                
Yes